# Patient Record
Sex: MALE | Race: WHITE | NOT HISPANIC OR LATINO
[De-identification: names, ages, dates, MRNs, and addresses within clinical notes are randomized per-mention and may not be internally consistent; named-entity substitution may affect disease eponyms.]

---

## 2017-10-26 ENCOUNTER — CHARTING TRANS (OUTPATIENT)
Dept: OTHER | Age: 42
End: 2017-10-26

## 2018-03-22 ENCOUNTER — CHARTING TRANS (OUTPATIENT)
Dept: OTHER | Age: 43
End: 2018-03-22

## 2018-03-22 ENCOUNTER — LAB SERVICES (OUTPATIENT)
Dept: OTHER | Age: 43
End: 2018-03-22

## 2018-03-22 ENCOUNTER — MYAURORA ACCOUNT LINK (OUTPATIENT)
Dept: OTHER | Age: 43
End: 2018-03-22

## 2018-03-23 ENCOUNTER — CHARTING TRANS (OUTPATIENT)
Dept: OTHER | Age: 43
End: 2018-03-23

## 2018-03-23 LAB
APPEARANCE UR: CLEAR
BACTERIA #/AREA URNS HPF: ABNORMAL /HPF
BILIRUB UR QL: NEGATIVE
COLOR UR: COLORLESS
GLUCOSE UR-MCNC: NEGATIVE MG/DL
HYALINE CASTS #/AREA URNS LPF: ABNORMAL /LPF (ref 0–5)
KETONES UR-MCNC: NEGATIVE MG/DL
NITRITE UR QL: NEGATIVE
PH UR: 6 UNITS (ref 5–7)
PROT UR QL: NEGATIVE MG/DL
RBC #/AREA URNS HPF: ABNORMAL /HPF (ref 0–3)
RBC-URINE: NEGATIVE
SP GR UR: 1.01 (ref 1–1.03)
SPECIMEN SOURCE: ABNORMAL
SQUAMOUS #/AREA URNS HPF: ABNORMAL /HPF (ref 0–5)
UROBILINOGEN UR QL: 0.2 MG/DL (ref 0–1)
WBC #/AREA URNS HPF: ABNORMAL /HPF (ref 0–5)
WBC-URINE: NEGATIVE

## 2018-03-26 ENCOUNTER — CHARTING TRANS (OUTPATIENT)
Dept: OTHER | Age: 43
End: 2018-03-26

## 2018-03-26 LAB
ALBUMIN SERPL-MCNC: 4.4 G/DL (ref 3.6–5.1)
ALBUMIN/GLOB SERPL: 1.3 (ref 1–2.4)
ALP SERPL-CCNC: 70 UNITS/L (ref 45–117)
ALT SERPL-CCNC: 27 UNITS/L
ANION GAP SERPL CALC-SCNC: 13 MMOL/L (ref 10–20)
AST SERPL-CCNC: 31 UNITS/L
BASOPHILS # BLD: 0 K/MCL (ref 0–0.3)
BASOPHILS NFR BLD: 1 %
BILIRUB SERPL-MCNC: 0.4 MG/DL (ref 0.2–1)
BUN SERPL-MCNC: 31 MG/DL (ref 6–20)
BUN/CREAT SERPL: 34 (ref 7–25)
CALCIUM SERPL-MCNC: 9.7 MG/DL (ref 8.4–10.2)
CHLORIDE SERPL-SCNC: 103 MMOL/L (ref 98–107)
CHOLEST SERPL-MCNC: 191 MG/DL
CHOLEST/HDLC SERPL: 4.2
CO2 SERPL-SCNC: 29 MMOL/L (ref 21–32)
CREAT SERPL-MCNC: 0.91 MG/DL (ref 0.67–1.17)
DIFFERENTIAL METHOD BLD: ABNORMAL
EOSINOPHIL # BLD: 0.1 K/MCL (ref 0.1–0.5)
EOSINOPHIL NFR BLD: 2 %
ERYTHROCYTE [DISTWIDTH] IN BLOOD: 13.3 % (ref 11–15)
GLOBULIN SER-MCNC: 3.3 G/DL (ref 2–4)
GLUCOSE SERPL-MCNC: 90 MG/DL (ref 65–99)
HDLC SERPL-MCNC: 45 MG/DL
HEMATOCRIT: 45 % (ref 39–51)
HEMOGLOBIN: 14.3 G/DL (ref 13–17)
IMM GRANULOCYTES # BLD AUTO: 0 K/MCL (ref 0–0.2)
IMM GRANULOCYTES NFR BLD: 0 %
LDLC SERPL CALC-MCNC: 134 MG/DL
LENGTH OF FAST TIME PATIENT: 12 HRS
LENGTH OF FAST TIME PATIENT: 12 HRS
LYMPHOCYTES # BLD: 2.3 K/MCL (ref 1–4.8)
LYMPHOCYTES NFR BLD: 30 %
MEAN CORPUSCULAR HEMOGLOBIN: 26.7 PG (ref 26–34)
MEAN CORPUSCULAR HGB CONC: 31.8 G/DL (ref 32–36.5)
MEAN CORPUSCULAR VOLUME: 84 FL (ref 78–100)
MONOCYTES # BLD: 0.6 K/MCL (ref 0.3–0.9)
MONOCYTES NFR BLD: 8 %
NEUTROPHILS # BLD: 4.7 K/MCL (ref 1.8–7.7)
NEUTROPHILS NFR BLD: 59 %
NONHDLC SERPL-MCNC: 146 MG/DL
NRBC (NRBCRE): 0 %
PLATELET COUNT: 200 K/MCL (ref 140–450)
POTASSIUM SERPL-SCNC: 4.7 MMOL/L (ref 3.4–5.1)
RED CELL COUNT: 5.36 MIL/MCL (ref 4.5–5.9)
SODIUM SERPL-SCNC: 140 MMOL/L (ref 135–145)
TOTAL PROTEIN: 7.7 G/DL (ref 6.4–8.2)
TRIGL SERPL-MCNC: 62 MG/DL
TSH SERPL-ACNC: 1.82 MCUNITS/ML (ref 0.35–5)
WHITE BLOOD COUNT: 7.8 K/MCL (ref 4.2–11)

## 2018-10-22 PROCEDURE — 81003 URINALYSIS AUTO W/O SCOPE: CPT | Performed by: FAMILY MEDICINE

## 2018-11-01 VITALS
SYSTOLIC BLOOD PRESSURE: 134 MMHG | DIASTOLIC BLOOD PRESSURE: 78 MMHG | TEMPERATURE: 97.9 F | RESPIRATION RATE: 16 BRPM | HEART RATE: 74 BPM | BODY MASS INDEX: 22.45 KG/M2 | WEIGHT: 160.39 LBS | HEIGHT: 71 IN

## 2018-11-02 VITALS
WEIGHT: 160.5 LBS | BODY MASS INDEX: 22.47 KG/M2 | DIASTOLIC BLOOD PRESSURE: 71 MMHG | TEMPERATURE: 98 F | HEART RATE: 93 BPM | SYSTOLIC BLOOD PRESSURE: 124 MMHG | RESPIRATION RATE: 16 BRPM | HEIGHT: 71 IN

## 2019-01-31 PROBLEM — N13.8 BPH WITH OBSTRUCTION/LOWER URINARY TRACT SYMPTOMS: Status: ACTIVE | Noted: 2019-01-31

## 2019-01-31 PROBLEM — N52.9 ERECTILE DYSFUNCTION, UNSPECIFIED ERECTILE DYSFUNCTION TYPE: Status: ACTIVE | Noted: 2019-01-31

## 2019-01-31 PROBLEM — N40.1 BPH WITH OBSTRUCTION/LOWER URINARY TRACT SYMPTOMS: Status: ACTIVE | Noted: 2019-01-31

## 2019-06-21 ENCOUNTER — TELEPHONE (OUTPATIENT)
Dept: SCHEDULING | Age: 44
End: 2019-06-21

## 2019-08-10 PROBLEM — R73.9 ELEVATED BLOOD SUGAR: Status: ACTIVE | Noted: 2019-08-10

## 2019-08-10 PROBLEM — F41.9 ANXIETY: Status: ACTIVE | Noted: 2019-08-10

## 2024-01-30 ENCOUNTER — TELEPHONE (OUTPATIENT)
Dept: FAMILY MEDICINE | Age: 49
End: 2024-01-30

## 2024-03-05 ENCOUNTER — APPOINTMENT (OUTPATIENT)
Dept: FAMILY MEDICINE | Age: 49
End: 2024-03-05

## 2024-03-05 VITALS
WEIGHT: 170.19 LBS | OXYGEN SATURATION: 98 % | HEIGHT: 71 IN | HEART RATE: 88 BPM | RESPIRATION RATE: 18 BRPM | SYSTOLIC BLOOD PRESSURE: 142 MMHG | BODY MASS INDEX: 23.83 KG/M2 | DIASTOLIC BLOOD PRESSURE: 80 MMHG

## 2024-03-05 DIAGNOSIS — N13.8 BPH WITH OBSTRUCTION/LOWER URINARY TRACT SYMPTOMS: ICD-10-CM

## 2024-03-05 DIAGNOSIS — Z12.11 COLON CANCER SCREENING: ICD-10-CM

## 2024-03-05 DIAGNOSIS — N40.1 BPH WITH OBSTRUCTION/LOWER URINARY TRACT SYMPTOMS: ICD-10-CM

## 2024-03-05 DIAGNOSIS — N52.9 ERECTILE DYSFUNCTION, UNSPECIFIED ERECTILE DYSFUNCTION TYPE: ICD-10-CM

## 2024-03-05 DIAGNOSIS — Z00.00 PHYSICAL EXAM, ANNUAL: Primary | ICD-10-CM

## 2024-03-05 PROBLEM — F41.9 ANXIETY: Status: ACTIVE | Noted: 2019-08-10

## 2024-03-05 PROCEDURE — 99386 PREV VISIT NEW AGE 40-64: CPT | Performed by: NURSE PRACTITIONER

## 2024-03-05 RX ORDER — TADALAFIL 20 MG/1
20 TABLET ORAL DAILY PRN
Qty: 30 TABLET | Refills: 3 | Status: SHIPPED | OUTPATIENT
Start: 2024-03-05

## 2024-03-05 RX ORDER — TADALAFIL 20 MG/1
20 TABLET ORAL DAILY PRN
COMMUNITY
End: 2024-03-05 | Stop reason: SDUPTHER

## 2024-03-05 ASSESSMENT — PATIENT HEALTH QUESTIONNAIRE - PHQ9
1. LITTLE INTEREST OR PLEASURE IN DOING THINGS: NOT AT ALL
SUM OF ALL RESPONSES TO PHQ9 QUESTIONS 1 AND 2: 0
2. FEELING DOWN, DEPRESSED OR HOPELESS: NOT AT ALL
CLINICAL INTERPRETATION OF PHQ2 SCORE: NO FURTHER SCREENING NEEDED
SUM OF ALL RESPONSES TO PHQ9 QUESTIONS 1 AND 2: 0

## 2024-03-05 ASSESSMENT — ENCOUNTER SYMPTOMS
PSYCHIATRIC NEGATIVE: 1
RESPIRATORY NEGATIVE: 1
NEUROLOGICAL NEGATIVE: 1
CONSTITUTIONAL NEGATIVE: 1

## 2024-03-06 ENCOUNTER — APPOINTMENT (OUTPATIENT)
Dept: LAB | Age: 49
End: 2024-03-06

## 2024-03-06 ENCOUNTER — TELEPHONE (OUTPATIENT)
Dept: FAMILY MEDICINE | Age: 49
End: 2024-03-06

## 2024-03-06 DIAGNOSIS — H81.09 MENIERE'S DISEASE, UNSPECIFIED LATERALITY: Primary | ICD-10-CM

## 2024-03-07 PROBLEM — H81.09 MENIERE'S DISEASE: Status: ACTIVE | Noted: 2024-03-07

## 2024-07-02 ENCOUNTER — CLINICAL DOCUMENTATION (OUTPATIENT)
Dept: GASTROENTEROLOGY | Age: 49
End: 2024-07-02

## 2025-01-14 DIAGNOSIS — N52.9 ERECTILE DYSFUNCTION, UNSPECIFIED ERECTILE DYSFUNCTION TYPE: ICD-10-CM

## 2025-01-14 RX ORDER — TADALAFIL 20 MG/1
20 TABLET ORAL DAILY PRN
Qty: 10 TABLET | Refills: 0 | Status: SHIPPED | OUTPATIENT
Start: 2025-01-14

## 2025-01-23 ENCOUNTER — OFFICE VISIT (OUTPATIENT)
Facility: LOCATION | Age: 50
End: 2025-01-23

## 2025-01-23 VITALS — BODY MASS INDEX: 23.8 KG/M2 | HEIGHT: 71 IN | WEIGHT: 170 LBS

## 2025-01-23 DIAGNOSIS — H81.01: ICD-10-CM

## 2025-01-23 DIAGNOSIS — R42 VERTIGO: Primary | ICD-10-CM

## 2025-01-23 DIAGNOSIS — G43.809 VESTIBULAR MIGRAINE: ICD-10-CM

## 2025-01-23 PROCEDURE — 99204 OFFICE O/P NEW MOD 45 MIN: CPT | Performed by: OTOLARYNGOLOGY

## 2025-01-23 RX ORDER — TADALAFIL 20 MG/1
20 TABLET ORAL
COMMUNITY
Start: 2025-01-14

## 2025-01-23 RX ORDER — ONDANSETRON 4 MG/1
4 TABLET, FILM COATED ORAL
COMMUNITY
Start: 2022-09-23

## 2025-01-23 RX ORDER — HYDROXYZINE HYDROCHLORIDE 25 MG/1
25 TABLET, FILM COATED ORAL
COMMUNITY
Start: 2024-09-05

## 2025-01-23 RX ORDER — MECLIZINE HYDROCHLORIDE 25 MG/1
1 TABLET ORAL AS DIRECTED
COMMUNITY
Start: 2024-08-20

## 2025-01-23 RX ORDER — LORAZEPAM 1 MG/1
1 TABLET ORAL
COMMUNITY
Start: 2024-10-29

## 2025-01-23 RX ORDER — TRIAMTERENE AND HYDROCHLOROTHIAZIDE 37.5; 25 MG/1; MG/1
2 CAPSULE ORAL DAILY
COMMUNITY
Start: 2024-11-19

## 2025-01-23 RX ORDER — TADALAFIL 20 MG/1
20 TABLET ORAL
COMMUNITY
Start: 2022-10-24

## 2025-01-23 RX ORDER — TRIAMTERENE AND HYDROCHLOROTHIAZIDE 37.5; 25 MG/1; MG/1
1 CAPSULE ORAL EVERY MORNING
COMMUNITY
Start: 2024-11-19 | End: 2025-03-19

## 2025-01-23 RX ORDER — SUMATRIPTAN 50 MG/1
50 TABLET, FILM COATED ORAL
COMMUNITY
Start: 2024-10-29

## 2025-01-23 RX ORDER — TAMSULOSIN HYDROCHLORIDE 0.4 MG/1
0.4 CAPSULE ORAL
COMMUNITY
Start: 2022-12-04

## 2025-01-23 NOTE — PROGRESS NOTES
NEW PATIENT PROGRESS NOTE  OTOLOGY/OTOLARYNGOLOGY    REF MD:  No referring provider defined for this encounter.     PCP: Saul Gregory DO    CHIEF COMPLAINT:    Chief Complaint   Patient presents with    New Patient    Consult     Patient here for Meniere's disease consult.     HISTORY OF PRESENT ILLNESS: Navi Portillo is a 49 year old male patient presents for evaluation of Meniere's disease, which he has been experiencing in his right ear for the past three years. The patient had been seen at [facility] in 2021 by Dr. Lorenzo multiple times for Meniere's syndrome and chronic sinusitis. He has undergone an extensive workup by Dr. Silverman, and most recently saw Dr. Menchaca on 12/13/2024, who advised increasing his Dyazide dosage to two tablets per day, and recommended magnesium. He is referred here to consider vestibular migraine.     Patient reports experiencing dizziness that can last from 10 minutes to several hours. He describes the dizziness as feeling as if he is upside down and spinning. Between episodes, he experiences tinnitus and ear pressure. He also reports experiencing extremely brief episodes of vertigo, each lasting around 2 minutes. He also has a tone-like tinnitus when he presses on his right ear.    His most recent audiogram results show sloping sensorineural hearing loss in the right ear. MRI and CT scan results were normal. He has received three steroid injections with no improvement. The patient is currently on diphenhydramine and Dramamine. His only surgery to date has been a nasal septal repair. He has tried magnesium supplements, meclizine, and hydroxyzine all without notable improvement.    PAST MEDICAL HISTORY:    Past Medical History:    Anxiety    Erectile dysfunction, unspecified erectile dysfunction type    Migraine       PAST SURGICAL HISTORY:    Past Surgical History:   Procedure Laterality Date    Other surgical history  about 2013    Deviated Septum Repair    Other surgical  history  12/14/2018    Cystoscopy w/ Dr Jones    Sinus surgery        Vasectomy         Medications Ordered Prior to Encounter[1]    Allergies: Allergies[2]    SOCIAL HISTORY:    Social History     Tobacco Use    Smoking status: Never    Smokeless tobacco: Never   Substance Use Topics    Alcohol use: Yes       Family History   Problem Relation Age of Onset    No Known Problems Father     Other (Other) Mother         migraines       REVIEW OF SYSTEMS:   Positives are in bold  Neuro: Headache, facial weakness, facial numbness, neck pain, vertigo  ENT: Hearing change, tinnitus, otorrhea, otalgia, aural fullness, ear pressure, vertigo, imbalance  Sinus pressure, rhinorrhea, congestion, facial pain, jaw pain, dysphagia, odynophagia, sore throat, voice changes, shortness of breath    EXAMINATION:  I washed my hands with an alcohol-based hand gel prior to examination  Constitutional:   --Vitals: Height 5' 11\" (1.803 m), weight 170 lb (77.1 kg).  --General: no apparent distress, well-developed, conversant  Psych: affect pleasant and appropriate for age, alert and oriented  Neuro: Facial movement normal bilateral  Respiratory: No stridor, stertor or increased work of breathing  ENT:  --Ear: The bilateral ears were examined under binocular microscopy  Right ear microscopic exam:  Pinna: Normal, no lesions or masses.  Mastoid: Nontender on palpation.   External auditory canal: Clear, no masses or lesions.  Tympanic membrane: Intact, no lesions, normal landmarks.  Middle ear: Aerated.    Left ear microscopic exam:  Pinna: Normal, no lesions or masses.  Mastoid: Nontender on palpation.   External auditory canal: Clear, no masses or lesions.  Tympanic membrane: Intact, no lesions, normal landmarks.  Middle ear: Aerated.    CT Remporal Bones - 6/27/2024  IMPRESSION: Essentially negative CT temporal bones survey.    MRI IACs 2/25/2022  Impression   IMPRESSION:  Normal contrast-enhanced MRI of the brain and IACs.        ASSESSMENT/PLAN:  Navi Portillo is a 49 year old male with     ICD-10-CM   1. Vertigo  R42   2. Active cochleovestibular Meniere's disease, right  H81.01   3. Vestibular migraine  G43.809        IMPRESSION:  Possible right Ménière's disease  History of chronic migraine headache, now with possible vestibular migraine  CT Temporal Bones with no evidence of SSCD  Occasional vestibular paroxysmia, which can cause tone-like tinnitus when pressing on the right ear    PLAN:  -Recommend ECOG, VNG, and VEMP for further evaluation   -Patient recently underwent an audiogram 5-6 weeks ago, however, it is not available for review today. He will obtain a copy of the most recent audiogram and submit it via [employer]   -Patient had an MRI IACs from 2022. Patient has occasional vestibular paroxysmia, which can cause tone-like tinnitus when pressing on the right ear. Patient will try to obtain a CD of the MRI with [facility] to check for neurovascular conflict with cranial nerve 8.   -CT scan of the temporal bone was also performed, but images are not available. However, no SSCD was noted on the report.  -Continue the current management of Ménière's disease and migraines.  -Patient had received IT injections in the past but did not undergo a series of 4 injections. It is unclear what his response was.   -Lifestyle changes including stress reduction, migraine diet (caffeine cessation), sleep hygiene, hydration, regular meals discussed  -Patient education: Migraine: More than a Headache; this packet includes information regarding medication side effects  -Symptom diary  -Follow-up after testing     Situation reviewed with the patient in detail.    Attention: This note has been scribed by Quita Quezada under the supervision of Liam Eng MD.     Liam Eng MD  Otology/Otolaryngology  [facility] [address] [place] [address]  Phone [phone]  Fax  [phone]      I have personally performed the services described in this documentation. All medical record entries made by the scribe were at my direction and in my presence. I have reviewed the chart and agree that the medical record reflects my personal performance and is accurate and complete.             [1]   Current Outpatient Medications on File Prior to Visit   Medication Sig Dispense Refill    triamterene-hydroCHLOROthiazide 37.5-25 MG Oral Cap Take 1 capsule by mouth every morning.      triamterene-hydroCHLOROthiazide 37.5-25 MG Oral Cap Take 2 capsules by mouth daily.      hydrOXYzine 25 MG Oral Tab Take 1 tablet (25 mg total) by mouth. (Patient not taking: Reported on 1/23/2025)      LORazepam 1 MG Oral Tab Take 1 tablet (1 mg total) by mouth. (Patient not taking: Reported on 1/23/2025)      meclizine 25 MG Oral Tab Take 1 tablet (25 mg total) by mouth As Directed. (Patient not taking: Reported on 1/23/2025)      ondansetron (ZOFRAN) 4 mg tablet 1 tablet (4 mg total). (Patient not taking: Reported on 1/23/2025)      SUMAtriptan 50 MG Oral Tab Take 1 tablet (50 mg total) by mouth. (Patient not taking: Reported on 1/23/2025)      Tadalafil 20 MG Oral Tab Take 1 tablet (20 mg total) by mouth daily as needed. (Patient not taking: Reported on 1/23/2025)      Tadalafil, PAH, 20 MG Oral Tab Take 20 mg by mouth daily as needed. (Patient not taking: Reported on 1/23/2025)      tamsulosin (FLOMAX) 0.4 MG Oral Cap 1 capsule (0.4 mg total). (Patient not taking: Reported on 1/23/2025)      cyclobenzaprine 5 MG Oral Tab Take 1 tablet (5 mg total) by mouth nightly as needed for Muscle spasms. (Patient not taking: Reported on 1/23/2025) 10 tablet 0    Sildenafil Citrate 100 MG Oral Tab Take 1/2 TO 1 tablet (100 mg total) by mouth as needed for Erectile Dysfunction. (Patient not taking: Reported on 1/23/2025) 30 tablet 3     No current facility-administered medications on file prior to visit.   [2] No Known  Allergies

## 2025-02-26 ENCOUNTER — OFFICE VISIT (OUTPATIENT)
Dept: AUDIOLOGY | Facility: CLINIC | Age: 50
End: 2025-02-26

## 2025-02-26 DIAGNOSIS — R42 DIZZINESS: Primary | ICD-10-CM

## 2025-02-26 PROBLEM — H90.3 ASYMMETRICAL SENSORINEURAL HEARING LOSS: Status: ACTIVE | Noted: 2025-02-26

## 2025-02-26 PROCEDURE — 92517 VEMP TEST I&R CERVICAL: CPT | Performed by: AUDIOLOGIST

## 2025-02-26 PROCEDURE — 92567 TYMPANOMETRY: CPT | Performed by: AUDIOLOGIST

## 2025-02-26 PROCEDURE — 92540 BASIC VESTIBULAR EVALUATION: CPT | Performed by: AUDIOLOGIST

## 2025-02-26 PROCEDURE — 92557 COMPREHENSIVE HEARING TEST: CPT | Performed by: AUDIOLOGIST

## 2025-02-26 PROCEDURE — 92537 CALORIC VSTBLR TEST W/REC: CPT | Performed by: AUDIOLOGIST

## 2025-02-27 ENCOUNTER — TELEPHONE (OUTPATIENT)
Dept: AUDIOLOGY | Facility: CLINIC | Age: 50
End: 2025-02-27

## 2025-02-27 NOTE — TELEPHONE ENCOUNTER
Patient was scheduled for an ECOG on 3//4/2025.  After having hearing testing done yesterday and consultation with Dr. Wall, it was decided that loss is too severe for ECOG testing.  Spoke with patient re:above and advised we are cancelling the ECOG on 3/4/2025- he should wait to hear from Dr. Wall after she gets a chance to review yesterdays test results.  Patient is aware

## 2025-02-27 NOTE — PROGRESS NOTES
Audiometrics:  VNG    Navi Portillo  12/6/1975  [id number]    Navi Portillo was referred for testing by Liam Eng.     History:  1/23/2025  From Dr. Wall    HISTORY OF PRESENT ILLNESS: Navi Portillo is a 49 year old male patient presents for evaluation of Meniere's disease, which he has been experiencing in his right ear for the past three years. The patient had been seen at [facility] in 2021 by Dr. Lorenzo multiple times for Meniere's syndrome and chronic sinusitis. He has undergone an extensive workup by Dr. Silverman, and most recently saw Dr. Menchaca on 12/13/2024, who advised increasing his Dyazide dosage to two tablets per day, and recommended magnesium. He is referred here to consider vestibular migraine.      Patient reports experiencing dizziness that can last from 10 minutes to several hours. He describes the dizziness as feeling as if he is upside down and spinning. Between episodes, he experiences tinnitus and ear pressure. He also reports experiencing extremely brief episodes of vertigo, each lasting around 2 minutes. He also has a tone-like tinnitus when he presses on his right ear.     His most recent audiogram results show sloping sensorineural hearing loss in the right ear. MRI and CT scan results were normal. He has received three steroid injections with no improvement. The patient is currently on diphenhydramine and Dramamine. His only surgery to date has been a nasal septal repair. He has tried magnesium supplements, meclizine, and hydroxyzine all without notable improvement.    Today-  Patient noted as above. His hearing remains poor on the right side and has roaring tinnitus.  Per report, since Dr. Menchaca increased the dosage of his diuretic two months ago, he has had no bouts of vertigo and he feels much better    He noted following all VNG instructions prior to testing today.    Procedures completed today:  Saccade Test: Normal peak velocities, accuracies and  latencies for horizontal saccades.    Gaze Nystagmus Test: No gaze nystagmus noted.    Tracking Test: Normal horizontal tracking in both directions.    Optokinetic Test: Symmetrical optokinetic testing in both directions at the increased intensity of 40/sec.    Head Shaking Test: No nystagmus was present during horizontal head shaking test.    [place]-Hallpike Maneuver: No evidence of torsional nystagmus during the Carlson-Franklin maneuver.    Positioning Test: No positioning or positional nystagmus was observed.    Bithermal Caloric Test:   Left cool caloric: direction -right, 9 deg/sec  Right cool caloric: direction -left, 4 deg/sec  Left warm caloric: direction -left, 9 deg/sec  Right warm caloric: direction -right, 6 deg/sec    Interpretation:  Right unilateral caloric weakness.    Caloric responses of the right ear are 28% weaker than those of the left ear.  Unilateral weakness measured as greater than 25% is considered to be clinically significant.  Caloric weakness usually indicates a peripheral vestibular lesion involving the lateral canal or its afferent pathways.    Borderline bilateral caloric weakness.    Bilateral weakness is categorized as sum for all 4 caloric responses less than 22 deg  ( Patient was 28)  Or Total responses from each ear less than 12 deg   ( Patient R=10. L=18)    Recommendations:  Follow-up with Liam Eng M.D. for results.        Vestibular Evoked Myogenic Potential (VEMP)    Stimulus Parameters: Yesica gated 500HZ tonebursts presented at 97dBnHL.  Transducers used: inserts      Despite repeated attempts and runs, poor repeatability and reliability for the waveforms  Singular responses were obtained for marking   Right ear responses were significantly delayed  Interpret with caution     Amplitude:  Right:  abnormal 43.73 uV.  Left:  abnormal 34.51 uV.    Normal amplitudes = 60uV    Asymmetry ratio shows a .12 weakness in the left ear.  This is an insignificant finding.      2/27/2025  Kervin Lopez

## 2025-03-20 ENCOUNTER — LAB ENCOUNTER (OUTPATIENT)
Dept: LAB | Age: 50
End: 2025-03-20
Attending: OTOLARYNGOLOGY
Payer: MEDICAID

## 2025-03-20 ENCOUNTER — OFFICE VISIT (OUTPATIENT)
Facility: LOCATION | Age: 50
End: 2025-03-20
Payer: MEDICAID

## 2025-03-20 VITALS — BODY MASS INDEX: 23.8 KG/M2 | WEIGHT: 170 LBS | HEIGHT: 71 IN

## 2025-03-20 DIAGNOSIS — H90.3 ASYMMETRIC SNHL (SENSORINEURAL HEARING LOSS): ICD-10-CM

## 2025-03-20 DIAGNOSIS — H81.8X9 VESTIBULAR PAROXYSMIA: Primary | ICD-10-CM

## 2025-03-20 DIAGNOSIS — H83.8X1 SUPERIOR SEMICIRCULAR CANAL DEHISCENCE OF RIGHT EAR: ICD-10-CM

## 2025-03-20 DIAGNOSIS — R42 VERTIGO: ICD-10-CM

## 2025-03-20 LAB
ANION GAP SERPL CALC-SCNC: 3 MMOL/L (ref 0–18)
BUN BLD-MCNC: 28 MG/DL (ref 9–23)
BUN/CREAT SERPL: 25 (ref 10–20)
CALCIUM BLD-MCNC: 9.4 MG/DL (ref 8.7–10.4)
CHLORIDE SERPL-SCNC: 101 MMOL/L (ref 98–112)
CO2 SERPL-SCNC: 33 MMOL/L (ref 21–32)
CREAT BLD-MCNC: 1.12 MG/DL
EGFRCR SERPLBLD CKD-EPI 2021: 80 ML/MIN/1.73M2 (ref 60–?)
GLUCOSE BLD-MCNC: 89 MG/DL (ref 70–99)
OSMOLALITY SERPL CALC.SUM OF ELEC: 289 MOSM/KG (ref 275–295)
POTASSIUM SERPL-SCNC: 5 MMOL/L (ref 3.5–5.1)
SODIUM SERPL-SCNC: 137 MMOL/L (ref 136–145)

## 2025-03-20 PROCEDURE — 36415 COLL VENOUS BLD VENIPUNCTURE: CPT

## 2025-03-20 PROCEDURE — 80048 BASIC METABOLIC PNL TOTAL CA: CPT

## 2025-03-20 PROCEDURE — 99214 OFFICE O/P EST MOD 30 MIN: CPT | Performed by: OTOLARYNGOLOGY

## 2025-03-20 RX ORDER — TRIAMTERENE AND HYDROCHLOROTHIAZIDE 37.5; 25 MG/1; MG/1
1 CAPSULE ORAL 2 TIMES DAILY
Qty: 60 CAPSULE | Refills: 1 | Status: SHIPPED | OUTPATIENT
Start: 2025-03-20

## 2025-03-20 RX ORDER — VENLAFAXINE HYDROCHLORIDE 37.5 MG/1
37.5 CAPSULE, EXTENDED RELEASE ORAL DAILY
Qty: 30 CAPSULE | Refills: 1 | Status: SHIPPED | OUTPATIENT
Start: 2025-03-20

## 2025-03-20 NOTE — PROGRESS NOTES
PROGRESS NOTE  OTOLOGY/OTOLARYNGOLOGY    REF MD:  No referring provider defined for this encounter.     PCP: Saul Gregory DO    CHIEF COMPLAINT:    No chief complaint on file.    LAST VISIT 1/23/2025  IMPRESSION:  Possible right Ménière's disease  History of chronic migraine headache, now with possible vestibular migraine  CT Temporal Bones with no evidence of SSCD  Occasional vestibular paroxysmia, which can cause tone-like tinnitus when pressing on the right ear    PLAN:  -Recommend ECOG, VNG, and VEMP for further evaluation   -Patient recently underwent an audiogram 5-6 weeks ago, however, it is not available for review today. He will obtain a copy of the most recent audiogram and submit it via [employer]   -Patient had an MRI IACs from 2022. Patient has occasional vestibular paroxysmia, which can cause tone-like tinnitus when pressing on the right ear. Patient will try to obtain a CD of the MRI with [facility] to check for neurovascular conflict with cranial nerve 8.   -CT scan of the temporal bone was also performed, but images are not available. However, no SSCD was noted on the report.  -Continue the current management of Ménière's disease and migraines.  -Patient had received IT injections in the past but did not undergo a series of 4 injections. It is unclear what his response was.   -Lifestyle changes including stress reduction, migraine diet (caffeine cessation), sleep hygiene, hydration, regular meals discussed  -Patient education: Migraine: More than a Headache; this packet includes information regarding medication side effects  -Symptom diary  -Follow-up after testing   ______________________________________________________________________  Interval history: An increase in Diazide has been helpful. However, symptoms persist, including a constant sensation of feeling sideways and ongoing tingling in the head. Feels as though something could happen at any moment. Symptoms are exacerbated by certain  movements, such as bending, and he is unable to engage in certain physical activities due to these limitations. ECOG was not performed due to degree of hearing loss.       HISTORY OF PRESENT ILLNESS: Navi Portillo is a 49 year old male patient presents for evaluation of Meniere's disease, which he has been experiencing in his right ear for the past three years. The patient had been seen at [facility] in 2021 by Dr. Lorenzo multiple times for Meniere's syndrome and chronic sinusitis. He has undergone an extensive workup by Dr. Silverman, and most recently saw Dr. Menchaca on 12/13/2024, who advised increasing his Dyazide dosage to two tablets per day, and recommended magnesium. He is referred here to consider vestibular migraine.     Patient reports experiencing dizziness that can last from 10 minutes to several hours. He describes the dizziness as feeling as if he is upside down and spinning. Between episodes, he experiences tinnitus and ear pressure. He also reports experiencing extremely brief episodes of vertigo, each lasting around 2 minutes. He also has a tone-like tinnitus when he presses on his right ear.    His most recent audiogram results show sloping sensorineural hearing loss in the right ear. MRI and CT scan results were normal. He has received three steroid injections with no improvement. The patient is currently on diphenhydramine and Dramamine. His only surgery to date has been a nasal septal repair. He has tried magnesium supplements, meclizine, and hydroxyzine all without notable improvement.    PAST MEDICAL HISTORY:    Past Medical History:    Anxiety    Erectile dysfunction, unspecified erectile dysfunction type    Migraine       PAST SURGICAL HISTORY:    Past Surgical History:   Procedure Laterality Date    Other surgical history  about 2013    Deviated Septum Repair    Other surgical history  12/14/2018    Cystoscopy w/ Dr Jones    Sinus surgery        Vasectomy         Medications Ordered  Prior to Encounter[1]    Allergies: Allergies[2]    SOCIAL HISTORY:    Social History     Tobacco Use    Smoking status: Never    Smokeless tobacco: Never   Substance Use Topics    Alcohol use: Yes       Family History   Problem Relation Age of Onset    No Known Problems Father     Other (Other) Mother         migraines       REVIEW OF SYSTEMS:   Positives are in bold  Neuro: Headache, facial weakness, facial numbness, neck pain, vertigo  ENT: Hearing change, tinnitus, otorrhea, otalgia, aural fullness, ear pressure, vertigo, imbalance  Sinus pressure, rhinorrhea, congestion, facial pain, jaw pain, dysphagia, odynophagia, sore throat, voice changes, shortness of breath    EXAMINATION:  I washed my hands with an alcohol-based hand gel prior to examination  Constitutional:   --Vitals: There were no vitals taken for this visit.  --General: no apparent distress, well-developed, conversant  Psych: affect pleasant and appropriate for age, alert and oriented  Neuro: Facial movement normal bilateral  Respiratory: No stridor, stertor or increased work of breathing  ENT:  --Ear: The bilateral ears were examined under binocular microscopy  Right ear microscopic exam:  Pinna: Normal, no lesions or masses.  Mastoid: Nontender on palpation.   External auditory canal: Clear, no masses or lesions.  Tympanic membrane: Intact, no lesions, normal landmarks.  Middle ear: Aerated.    Left ear microscopic exam:  Pinna: Normal, no lesions or masses.  Mastoid: Nontender on palpation.   External auditory canal: Clear, no masses or lesions.  Tympanic membrane: Intact, no lesions, normal landmarks.  Middle ear: Aerated.    CT Remporal Bones - 6/27/2024  IMPRESSION: Essentially negative CT temporal bones survey.    MRI IACs 2/25/2022  Impression   IMPRESSION:  Normal contrast-enhanced MRI of the brain and IACs.     VNG 2/27/2025  Interpretation:  Right unilateral caloric weakness.    Caloric responses of the right ear are 28% weaker than those  of the left ear.  Unilateral weakness measured as greater than 25% is considered to be clinically significant.  Caloric weakness usually indicates a peripheral vestibular lesion involving the lateral canal or its afferent pathways.     Borderline bilateral caloric weakness.    Bilateral weakness is categorized as sum for all 4 caloric responses less than 22 deg  ( Patient was 28)  Or Total responses from each ear less than 12 deg   ( Patient R=10. L=18)     Latest Audiogram Result (Hz) Exam performed: 2/26/2025 3:20 PM Last edited by Jesenia Stubbs Au.D on 2/26/2025 3:30 PM        125 250  1500 2000 3000 4000 6000 8000    Right air:         60  65    Left air:  10 20  15  15 45 40 10 20    Right air (masked):  65 65  65  60  60      Left mastoid bone:   5  15  10 35 45      Right mastoid bone (masked):   50N  60  60  55         Reliability:  Fair    Transducer:  Inserts    Technique:  Conventional Audiometry    Comments:  Negative Mindy 1000z          Latest Speech Audiometry  Last edited by Jesenia Stubbs Au.D on 2/26/2025 3:30 PM       Ear Method PTA SAT SRT [facility] Test/list Score (%) Intensity Mask/noise Notes    right live voice  65    Half-List 12 75  masked    left live voice  20    10 By Difficulty 96 55                    Latest Tympanogram Result       Probe Tone (Hz): 226 Exam performed: 2/26/2025 3:20 PM Last edited by Jesenia Stubbs Au.D on 2/26/2025 3:31 PM      Tympanograms  These were drawn by a user, not generated from device data      Right Ear Left Ear                     Right Ear Left Ear    Tympanogram type: Type A Type C    Canal volume (mL): 1.3 1.2    Peak pressure (daPa): 10 -101    Peak amplitude (mL): 0.8 0.79    Tympanogram width (daPa):        Comments:                    Latest Audiogram and Tympanogram Result Text  Last edited by Jesenia Stubbs Au.D on 2/26/2025  4:03 PM      Addendum      Patient here for testing on referral from Dr. Wall  He is here for  HT/VNG/VEMP today    ECoG testing is scheduled for 3/4/2025    See history and notes on VNG/VEMP report     Otoscopic Inspection:  both ears: no cerumen and TM visualized    Summary  Right: Moderately severe SNHL  Left: WNL with moderate notched SNHL at 3000/4000Hz.    Normal tympanogram for the right ear   Negative pressure tympanogram for the left ear     Follow up with Liam Eng M.D..  Audiological monitoring as needed during the course of medical management.       Addended by Jesenia Stubbs Au.D on 2/26/2025  4:03 PM                ASSESSMENT/PLAN:  Navi Portillo is a 49 year old male with   No diagnosis found.       IMPRESSION:  Possible right Ménière's disease  History of chronic migraine headache, now with possible vestibular migraine  Occasional vestibular paroxysmia, which can cause tone-like tinnitus when pressing on the right ear  Right moderately severe SNHL  Left mild SNHL, in high frequencies only    PLAN:  -VNG reviewed and Audiogram reviewed with patient  -ECOG testing was unable to be performed due to severe hearing loss, 28% caloric weakness on right, but slight hypofunction on left  -Start venlafaxine 37.5 mg daily  -Possible common side effects include fatigue, mood change, tachycardia, hypertension, constipation. Ok to stop without a taper if needed at this low dose  -Next option for medication will be topiramate  -Recommend repeat CT Temporal Bones w/o contrast to evaluate for third window such as SSCD.   -BMP ordered to monitor kidney function and electrolytes  -I recommend asymmetric sensorineural hearing loss and/or unilateral tinnitus be evaluated with an MRI of the brain and IAC's with gadolinium. This test is medically necessary to assess for retrocochlear pathology such as a vestibular schwannoma  -Patient had an MRI IACs from 2022. Patient will try to obtain a CD of the MRI with [facility] to check for neurovascular conflict with cranial nerve 8. Otherwise, repeat  imaging has been ordered  -Continue the current management of Ménière's disease and migraines.  -Continue dyazide - usually I do not recommend BID dosing, but this was initiated by an outside provider and patient feels symptom improvement with this regimen - refilled   -Patient had received IT injections in the past but did not undergo a series of 4 injections. It is unclear what his response was.   -Lifestyle changes including stress reduction, migraine diet (caffeine cessation), sleep hygiene, hydration, regular meals discussed  -Patient education: Migraine: More than a Headache; this packet includes information regarding medication side effects  -Symptom diary  -Follow up after imaging     Situation reviewed with the patient in detail.    Attention: This note has been scribed by Quita Quezada under the supervision of Liam Eng MD.     Liam Eng MD  Otology/Otolaryngology  [facility] [address] [address]  Phone [phone]  Fax [phone]      I have personally performed the services described in this documentation. All medical record entries made by the scribe were at my direction and in my presence. I have reviewed the chart and agree that the medical record reflects my personal performance and is accurate and complete.             [1]   Current Outpatient Medications on File Prior to Visit   Medication Sig Dispense Refill    hydrOXYzine 25 MG Oral Tab Take 1 tablet (25 mg total) by mouth. (Patient not taking: Reported on 1/23/2025)      LORazepam 1 MG Oral Tab Take 1 tablet (1 mg total) by mouth. (Patient not taking: Reported on 1/23/2025)      meclizine 25 MG Oral Tab Take 1 tablet (25 mg total) by mouth As Directed. (Patient not taking: Reported on 1/23/2025)      ondansetron (ZOFRAN) 4 mg tablet 1 tablet (4 mg total). (Patient not taking: Reported on 1/23/2025)      SUMAtriptan 50 MG Oral Tab Take 1 tablet (50 mg total) by  mouth. (Patient not taking: Reported on 1/23/2025)      Tadalafil 20 MG Oral Tab Take 1 tablet (20 mg total) by mouth daily as needed. (Patient not taking: Reported on 1/23/2025)      Tadalafil, PAH, 20 MG Oral Tab Take 20 mg by mouth daily as needed. (Patient not taking: Reported on 1/23/2025)      tamsulosin (FLOMAX) 0.4 MG Oral Cap 1 capsule (0.4 mg total). (Patient not taking: Reported on 1/23/2025)      triamterene-hydroCHLOROthiazide 37.5-25 MG Oral Cap Take 1 capsule by mouth every morning.      triamterene-hydroCHLOROthiazide 37.5-25 MG Oral Cap Take 2 capsules by mouth daily.      cyclobenzaprine 5 MG Oral Tab Take 1 tablet (5 mg total) by mouth nightly as needed for Muscle spasms. (Patient not taking: Reported on 1/23/2025) 10 tablet 0    Sildenafil Citrate 100 MG Oral Tab Take 1/2 TO 1 tablet (100 mg total) by mouth as needed for Erectile Dysfunction. (Patient not taking: Reported on 1/23/2025) 30 tablet 3     No current facility-administered medications on file prior to visit.   [2] No Known Allergies

## 2025-04-07 ENCOUNTER — HOSPITAL ENCOUNTER (OUTPATIENT)
Dept: CT IMAGING | Age: 50
Discharge: HOME | End: 2025-04-07
Attending: OTOLARYNGOLOGY
Payer: MEDICAID

## 2025-04-07 DIAGNOSIS — H83.8X1 SUPERIOR SEMICIRCULAR CANAL DEHISCENCE OF RIGHT EAR: ICD-10-CM

## 2025-04-07 PROCEDURE — 70480 CT ORBIT/EAR/FOSSA W/O DYE: CPT | Performed by: OTOLARYNGOLOGY

## 2025-04-21 ENCOUNTER — HOSPITAL ENCOUNTER (OUTPATIENT)
Dept: MRI IMAGING | Age: 50
Discharge: HOME | End: 2025-04-21
Attending: OTOLARYNGOLOGY
Payer: MEDICAID

## 2025-04-21 DIAGNOSIS — H81.8X9 VESTIBULAR PAROXYSMIA: ICD-10-CM

## 2025-04-21 PROCEDURE — 70553 MRI BRAIN STEM W/O & W/DYE: CPT | Performed by: OTOLARYNGOLOGY

## 2025-04-21 PROCEDURE — A9575 INJ GADOTERATE MEGLUMI 0.1ML: HCPCS | Performed by: OTOLARYNGOLOGY

## 2025-04-21 RX ORDER — GADOTERATE MEGLUMINE 376.9 MG/ML
15 INJECTION INTRAVENOUS
Status: COMPLETED | OUTPATIENT
Start: 2025-04-21 | End: 2025-04-21

## 2025-04-21 RX ADMIN — GADOTERATE MEGLUMINE 15 ML: 376.9 INJECTION INTRAVENOUS at 13:40:00

## 2025-05-01 ENCOUNTER — OFFICE VISIT (OUTPATIENT)
Facility: LOCATION | Age: 50
End: 2025-05-01
Payer: MEDICAID

## 2025-05-01 VITALS — HEIGHT: 71 IN | BODY MASS INDEX: 23.8 KG/M2 | WEIGHT: 170 LBS

## 2025-05-01 DIAGNOSIS — H81.01: Primary | ICD-10-CM

## 2025-05-01 DIAGNOSIS — H81.8X9 VESTIBULAR PAROXYSMIA: ICD-10-CM

## 2025-05-01 DIAGNOSIS — H90.3 ASYMMETRIC SNHL (SENSORINEURAL HEARING LOSS): ICD-10-CM

## 2025-05-01 DIAGNOSIS — R42 VERTIGO: ICD-10-CM

## 2025-05-01 DIAGNOSIS — G43.809 VESTIBULAR MIGRAINE: ICD-10-CM

## 2025-05-01 PROCEDURE — 99214 OFFICE O/P EST MOD 30 MIN: CPT | Performed by: OTOLARYNGOLOGY

## 2025-05-01 RX ORDER — TOPIRAMATE 25 MG/1
25 TABLET, FILM COATED ORAL 2 TIMES DAILY
Qty: 60 TABLET | Refills: 1 | Status: SHIPPED | OUTPATIENT
Start: 2025-05-01

## 2025-05-01 NOTE — PROGRESS NOTES
The following individual(s) verbally consented to be recorded using ambient AI listening technology and understand that they can each withdraw their consent to this listening technology at any point by asking the clinician to turn off or pause the recording: patient verbalized consent    Patient name: Navi LUCI Portillo

## 2025-05-01 NOTE — PROGRESS NOTES
PROGRESS NOTE  OTOLOGY/OTOLARYNGOLOGY    REF MD:  No referring provider defined for this encounter.     PCP: None Pcp    CHIEF COMPLAINT:    Chief Complaint   Patient presents with    Follow - Up     vestibular paroxysmia    Results     Discuss CT and MRI results     History of Present Illness  Navi Portillo is a 49 year old male with Meniere's disease, vestibular migarine who presents for follow-up regarding medication management and symptom control.    He has been managing Meniere's disease for approximately four years, which significantly impacts his daily life. Currently, he is not experiencing any attacks, attributing this to avoiding certain activities. The condition profoundly affects his quality of life, as he cannot function, drive, or engage in normal activities. He is actively seeking treatment options to manage his symptoms and improve his quality of life.    He recently trialed venlafaxine for three days but discontinued it due to adverse effects, including nausea and a sensation of a 'lump in his throat' that made him feel like he was gagging. He did not notice any improvement in his symptoms during this short trial.    He is currently taking dyazide twice daily. His creatinine level has increased from 0.93 to 1.12, which remains within the normal range but is trending upwards. He is concerned about the impact of his condition on his ability to drive safely, fearing he could cause harm if driving.    No history of kidney stones, sulfa drug allergies, or glaucoma. Flying exacerbates his symptoms, causing issues with pressure equalization and resulting in prolonged discomfort.    He has undergone prior diagnostic imaging, including a CT and MRI, both of which were normal. He has not had a series of four intertrypanic injections, which were previously administered non-concurrently.    He leads an active lifestyle and is concerned about the impact of his condition on his ability to maintain this  lifestyle. No nausea, dizziness, headaches, or changes in food preferences aside from the lump in the throat sensation experienced with venlafaxine.      HISTORY OF PRESENT ILLNESS: Navi Portillo is a 49 year old male patient presents for evaluation of Meniere's disease, which he has been experiencing in his right ear for the past three years. The patient had been seen at [facility] in 2021 by Dr. Lorenzo multiple times for Meniere's syndrome and chronic sinusitis. He has undergone an extensive workup by Dr. Silverman, and most recently saw Dr. Menchaca on 12/13/2024, who advised increasing his Dyazide dosage to two tablets per day, and recommended magnesium. He is referred here to consider vestibular migraine.     Patient reports experiencing dizziness that can last from 10 minutes to several hours. He describes the dizziness as feeling as if he is upside down and spinning. Between episodes, he experiences tinnitus and ear pressure. He also reports experiencing extremely brief episodes of vertigo, each lasting around 2 minutes. He also has a tone-like tinnitus when he presses on his right ear.    His most recent audiogram results show sloping sensorineural hearing loss in the right ear. MRI and CT scan results were normal. He has received three steroid injections with no improvement. The patient is currently on diphenhydramine and Dramamine. His only surgery to date has been a nasal septal repair. He has tried magnesium supplements, meclizine, and hydroxyzine all without notable improvement.    PAST MEDICAL HISTORY:    Past Medical History:    Anxiety    Erectile dysfunction, unspecified erectile dysfunction type    Migraine       PAST SURGICAL HISTORY:    Past Surgical History:   Procedure Laterality Date    Other surgical history  about 2013    Deviated Septum Repair    Other surgical history  12/14/2018    Cystoscopy w/ Dr Jones    Sinus surgery        Vasectomy         Medications Ordered Prior to  Encounter[1]    Allergies: Allergies[2]    SOCIAL HISTORY:    Social History     Tobacco Use    Smoking status: Never    Smokeless tobacco: Never   Substance Use Topics    Alcohol use: Yes       Family History   Problem Relation Age of Onset    No Known Problems Father     Other (Other) Mother         migraines       REVIEW OF SYSTEMS:   Positives are in bold  Neuro: Headache, facial weakness, facial numbness, neck pain, vertigo  ENT: Hearing change, tinnitus, otorrhea, otalgia, aural fullness, ear pressure, vertigo, imbalance  Sinus pressure, rhinorrhea, congestion, facial pain, jaw pain, dysphagia, odynophagia, sore throat, voice changes, shortness of breath    EXAMINATION:  I washed my hands with an alcohol-based hand gel prior to examination  Constitutional:   --Vitals: Height 5' 11\" (1.803 m), weight 170 lb (77.1 kg).  --General: no apparent distress, well-developed, conversant  Psych: affect pleasant and appropriate for age, alert and oriented  Neuro: Facial movement normal bilateral  Respiratory: No stridor, stertor or increased work of breathing  ENT:  --Ear: The bilateral ears were examined under binocular microscopy  Right ear microscopic exam:  Pinna: Normal, no lesions or masses.  Mastoid: Nontender on palpation.   External auditory canal: Clear, no masses or lesions.  Tympanic membrane: Intact, no lesions, normal landmarks.  Middle ear: Aerated.    Left ear microscopic exam:  Pinna: Normal, no lesions or masses.  Mastoid: Nontender on palpation.   External auditory canal: Clear, no masses or lesions.  Tympanic membrane: Intact, no lesions, normal landmarks.  Middle ear: Aerated.    CT Remporal Bones - 6/27/2024  IMPRESSION: Essentially negative CT temporal bones survey.    MRI IACs 2/25/2022  Impression   IMPRESSION:  Normal contrast-enhanced MRI of the brain and IACs.     VNG 2/27/2025  Interpretation:  Right unilateral caloric weakness.    Caloric responses of the right ear are 28% weaker than those  of the left ear.  Unilateral weakness measured as greater than 25% is considered to be clinically significant.  Caloric weakness usually indicates a peripheral vestibular lesion involving the lateral canal or its afferent pathways.     Borderline bilateral caloric weakness.    Bilateral weakness is categorized as sum for all 4 caloric responses less than 22 deg  ( Patient was 28)  Or Total responses from each ear less than 12 deg   ( Patient R=10. L=18)     Latest Audiogram Result (Hz) Exam performed: 2/26/2025 3:20 PM Last edited by Jesenia Stubbs Au.D on 2/26/2025 3:30 PM        125 250  1500 2000 3000 4000 6000 8000    Right air:         60  65    Left air:  10 20  15  15 45 40 10 20    Right air (masked):  65 65  65  60  60      Left mastoid bone:   5  15  10 35 45      Right mastoid bone (masked):   50N  60  60  55         Reliability:  Fair    Transducer:  Inserts    Technique:  Conventional Audiometry    Comments:  Negative Mindy 1000z          Latest Speech Audiometry  Last edited by Jesenia Stubbs Au.D on 2/26/2025 3:30 PM       Ear Method PTA SAT SRT [facility] Test/list Score (%) Intensity Mask/noise Notes    right live voice  65    Half-List 12 75  masked    left live voice  20    10 By Difficulty 96 55                    Latest Tympanogram Result       Probe Tone (Hz): 226 Exam performed: 2/26/2025 3:20 PM Last edited by Jesenia Stubbs Au.D on 2/26/2025 3:31 PM      Tympanograms  These were drawn by a user, not generated from device data      Right Ear Left Ear                     Right Ear Left Ear    Tympanogram type: Type A Type C    Canal volume (mL): 1.3 1.2    Peak pressure (daPa): 10 -101    Peak amplitude (mL): 0.8 0.79    Tympanogram width (daPa):        Comments:                    Latest Audiogram and Tympanogram Result Text  Last edited by Jesenia Stubbs Au.D on 2/26/2025  4:03 PM      Addendum      Patient here for testing on referral from Dr. Wall  He is here for  HT/VNG/VEMP today    ECoG testing is scheduled for 3/4/2025    See history and notes on VNG/VEMP report     Otoscopic Inspection:  both ears: no cerumen and TM visualized    Summary  Right: Moderately severe SNHL  Left: WNL with moderate notched SNHL at 3000/4000Hz.    Normal tympanogram for the right ear   Negative pressure tympanogram for the left ear     Follow up with Liam Eng M.D..  Audiological monitoring as needed during the course of medical management.       Addended by Jesenia Stubbs Au.D on 2/26/2025  4:03 PM                ASSESSMENT/PLAN:  Navi Portillo is a 49 year old male with     ICD-10-CM   1. Active cochleovestibular Meniere's disease, right  H81.01   2. Vestibular migraine  G43.809   3. Vertigo  R42   4. Asymmetric SNHL (sensorineural hearing loss)  H90.3   5. Vestibular paroxysmia  H81.8X9        Assessment & Plan  Right Ménière's disease  Right Ménière's disease with chronic migraine and possible vestibular migraine. Symptoms persistent for four years. Previous treatments ineffective. Surgery not recommended due to potential worsening of chronic migraine. Focus on medical management targeting cochleovestibular migraine. Topiramate proposed as next medication. Surgery considered only after exhausting medical options.  - Prescribe topiramate 25 mg at bedtime for one week, then increase to 25 mg twice a day if tolerated.  - Discuss potential side effects of topiramate: numbness, tingling, altered taste, stomach upset, appetite suppression, reduced sweating, mood changes, forgetfulness.  - Consider intertrypanic injections if necessary, emphasize concurrent medical management.    Chronic migraine with possible vestibular migraine  Chronic migraine with possible vestibular migraine contributing to Ménière's disease symptoms. Venlafaxine not tolerated due to severe side effects. Goal to find effective, tolerable medication. Topiramate next step with 50% chance of no side effects,  50% chance of significant side effects.  - Prescribe topiramate as outlined above.  - Monitor for side effects, adjust treatment as necessary.  - -Topiramate - Possible side effects including but not limited to paraesthesias, GI upset, appetite suppression, change in taste, anhydrosis, cognitive side effect (forgetfullness, word finding deficit) discussed. This medication can be stopped without a taper at any time if needed.     Worsening kidney function  Worsening kidney function with triamterene-hydrochlorothiazide. Creatinine increased from 0.93 to 1.12. Medication may contribute to issue. Advised dosage reduction due to risk of further decline.  - Encourage aggressive hydration to support kidney function.  - Monitor kidney function closely.  - Discuss with primary care provider about risks of continued triamterene-hydrochlorothiazide use.      Follow-up in 4 weeks    Situation reviewed with the patient in detail.    Liam Eng MD  Otology/Otolaryngology  [address] [place] [id number]  Phone [phone]  Fax [phone]                 [1]   Current Outpatient Medications on File Prior to Visit   Medication Sig Dispense Refill    venlafaxine ER 37.5 MG Oral Capsule SR 24 Hr Take 1 capsule (37.5 mg total) by mouth daily. 30 capsule 1    triamterene-hydroCHLOROthiazide 37.5-25 MG Oral Cap Take 1 capsule by mouth in the morning and 1 capsule before bedtime. 60 capsule 1    hydrOXYzine 25 MG Oral Tab Take 1 tablet (25 mg total) by mouth. (Patient not taking: Reported on 5/1/2025)      LORazepam 1 MG Oral Tab Take 1 tablet (1 mg total) by mouth. (Patient not taking: Reported on 5/1/2025)      meclizine 25 MG Oral Tab Take 1 tablet (25 mg total) by mouth As Directed. (Patient not taking: Reported on 5/1/2025)      ondansetron (ZOFRAN) 4 mg tablet 1 tablet (4 mg total). (Patient not taking: Reported on 5/1/2025)      SUMAtriptan 50 MG Oral Tab Take 1  tablet (50 mg total) by mouth. (Patient not taking: Reported on 5/1/2025)      Tadalafil 20 MG Oral Tab Take 1 tablet (20 mg total) by mouth daily as needed. (Patient not taking: Reported on 5/1/2025)      Tadalafil, PAH, 20 MG Oral Tab Take 20 mg by mouth daily as needed. (Patient not taking: Reported on 5/1/2025)      tamsulosin (FLOMAX) 0.4 MG Oral Cap 1 capsule (0.4 mg total). (Patient not taking: Reported on 5/1/2025)      cyclobenzaprine 5 MG Oral Tab Take 1 tablet (5 mg total) by mouth nightly as needed for Muscle spasms. (Patient not taking: Reported on 5/1/2025) 10 tablet 0    Sildenafil Citrate 100 MG Oral Tab Take 1/2 TO 1 tablet (100 mg total) by mouth as needed for Erectile Dysfunction. (Patient not taking: Reported on 5/1/2025) 30 tablet 3     No current facility-administered medications on file prior to visit.   [2] No Known Allergies

## 2025-06-05 ENCOUNTER — OFFICE VISIT (OUTPATIENT)
Facility: LOCATION | Age: 50
End: 2025-06-05
Payer: MEDICAID

## 2025-06-05 VITALS — WEIGHT: 170 LBS | HEIGHT: 71 IN | BODY MASS INDEX: 23.8 KG/M2

## 2025-06-05 DIAGNOSIS — H81.01: Primary | ICD-10-CM

## 2025-06-05 DIAGNOSIS — G43.809 VESTIBULAR MIGRAINE: ICD-10-CM

## 2025-06-05 DIAGNOSIS — H90.3 ASYMMETRIC SNHL (SENSORINEURAL HEARING LOSS): ICD-10-CM

## 2025-06-05 PROCEDURE — 99214 OFFICE O/P EST MOD 30 MIN: CPT | Performed by: OTOLARYNGOLOGY

## 2025-06-05 RX ORDER — NORTRIPTYLINE HYDROCHLORIDE 10 MG/1
20 CAPSULE ORAL NIGHTLY
Qty: 60 CAPSULE | Refills: 1 | Status: SHIPPED | OUTPATIENT
Start: 2025-06-05 | End: 2025-06-12

## 2025-06-05 NOTE — PROGRESS NOTES
The following individual(s) verbally consented to be recorded using ambient AI listening technology and understand that they can each withdraw their consent to this listening technology at any point by asking the clinician to turn off or pause the recording:  Yes to consent  Patient name: Navi Portillo

## 2025-06-05 NOTE — PROGRESS NOTES
PROGRESS NOTE  OTOLOGY/OTOLARYNGOLOGY    REF MD:  No referring provider defined for this encounter.     PCP: None Pcp    CHIEF COMPLAINT:    Chief Complaint   Patient presents with    Follow - Up     Patient here for Meniere's disease f/up and medication evaluation.     History of Present Illness  Navi Portillo is a 49 year old male with Meniere's disease who presents with worsening symptoms and medication side effects.    His symptoms worsened with the use of topiramate, which he stopped three weeks ago after experiencing daily headaches for two weeks and significant brain fog. The medication did not improve his symptoms and instead caused additional adverse effects.    He previously took venlafaxine twice daily, which he discontinued due to side effects including stomach upset and a gagging reflex. He is concerned about the long-term use of diuretics and is interested in exploring other treatment options.    He has undergone a series of three steroid injections in the ear for his Meniere's disease, with the first injection providing significant but short-lived relief. The injections were administered weeks apart by a previous doctor, and he recalls that his hearing loss has been documented for about a year, with a significant drop in hearing ability.    He has tried oral prednisone in the past without noticing any improvement in his dizziness. He is scheduled to travel to [place] from June 21st to July 1st and desires a medication that does not cause severe side effects, as previous medications have led to intolerable symptoms such as headaches and brain fog.    He mentions a history of erectile dysfunction, which he attributes to past use of Propecia, noting that it caused permanent dysfunction. He is concerned about potential worsening of this condition with new medications.      HISTORY OF PRESENT ILLNESS: Navi Portillo is a 49 year old male patient presents for evaluation of Meniere's disease, which  he has been experiencing in his right ear for the past three years. The patient had been seen at [facility] in 2021 by Dr. Lorenzo multiple times for Meniere's syndrome and chronic sinusitis. He has undergone an extensive workup by Dr. Silverman, and most recently saw Dr. Menchaca on 12/13/2024, who advised increasing his Dyazide dosage to two tablets per day, and recommended magnesium. He is referred here to consider vestibular migraine.     Patient reports experiencing dizziness that can last from 10 minutes to several hours. He describes the dizziness as feeling as if he is upside down and spinning. Between episodes, he experiences tinnitus and ear pressure. He also reports experiencing extremely brief episodes of vertigo, each lasting around 2 minutes. He also has a tone-like tinnitus when he presses on his right ear.    His most recent audiogram results show sloping sensorineural hearing loss in the right ear. MRI and CT scan results were normal. He has received three steroid injections with no improvement. The patient is currently on diphenhydramine and Dramamine. His only surgery to date has been a nasal septal repair. He has tried magnesium supplements, meclizine, and hydroxyzine all without notable improvement.    PAST MEDICAL HISTORY:    Past Medical History:    Anxiety    Erectile dysfunction, unspecified erectile dysfunction type    Migraine       PAST SURGICAL HISTORY:    Past Surgical History:   Procedure Laterality Date    Other surgical history  about 2013    Deviated Septum Repair    Other surgical history  12/14/2018    Cystoscopy w/ Dr Jones    Sinus surgery        Vasectomy         Medications Ordered Prior to Encounter[1]    Allergies: Allergies[2]    SOCIAL HISTORY:    Social History     Tobacco Use    Smoking status: Never    Smokeless tobacco: Never   Substance Use Topics    Alcohol use: Not Currently       Family History   Problem Relation Age of Onset    No Known Problems Father     Other  (Other) Mother         migraines       REVIEW OF SYSTEMS:   Positives are in bold  Neuro: Headache, facial weakness, facial numbness, neck pain, vertigo  ENT: Hearing change, tinnitus, otorrhea, otalgia, aural fullness, ear pressure, vertigo, imbalance  Sinus pressure, rhinorrhea, congestion, facial pain, jaw pain, dysphagia, odynophagia, sore throat, voice changes, shortness of breath    EXAMINATION:  I washed my hands with an alcohol-based hand gel prior to examination  Constitutional:   --Vitals: Height 5' 11\" (1.803 m), weight 170 lb (77.1 kg).  --General: no apparent distress, well-developed, conversant  Psych: affect pleasant and appropriate for age, alert and oriented  Neuro: Facial movement normal bilateral  Respiratory: No stridor, stertor or increased work of breathing  ENT:  --Ear: The bilateral ears were examined under binocular microscopy  Right ear microscopic exam:  Pinna: Normal, no lesions or masses.  Mastoid: Nontender on palpation.   External auditory canal: Clear, no masses or lesions.  Tympanic membrane: Intact, no lesions, normal landmarks.  Middle ear: Aerated.    Left ear microscopic exam:  Pinna: Normal, no lesions or masses.  Mastoid: Nontender on palpation.   External auditory canal: Clear, no masses or lesions.  Tympanic membrane: Intact, no lesions, normal landmarks.  Middle ear: Aerated.    CT Remporal Bones - 6/27/2024  IMPRESSION: Essentially negative CT temporal bones survey.    MRI IACs 2/25/2022  Impression   IMPRESSION:  Normal contrast-enhanced MRI of the brain and IACs.     VNG 2/27/2025  Interpretation:  Right unilateral caloric weakness.    Caloric responses of the right ear are 28% weaker than those of the left ear.  Unilateral weakness measured as greater than 25% is considered to be clinically significant.  Caloric weakness usually indicates a peripheral vestibular lesion involving the lateral canal or its afferent pathways.     Borderline bilateral caloric weakness.     Bilateral weakness is categorized as sum for all 4 caloric responses less than 22 deg  ( Patient was 28)  Or Total responses from each ear less than 12 deg   ( Patient R=10. L=18)     Latest Audiogram Result (Hz) Exam performed: 2/26/2025 3:20 PM Last edited by Jesenia Stubbs Au.D on 2/26/2025 3:30 PM        125 250  1500 2000 3000 4000 6000 8000    Right air:         60  65    Left air:  10 20  15  15 45 40 10 20    Right air (masked):  65 65  65  60  60      Left mastoid bone:   5  15  10 35 45      Right mastoid bone (masked):   50N  60  60  55         Reliability:  Fair    Transducer:  Inserts    Technique:  Conventional Audiometry    Comments:  Negative Mindy 1000z          Latest Speech Audiometry  Last edited by Jesenia Stubbs Au.D on 2/26/2025 3:30 PM       Ear Method PTA SAT SRT [facility] Test/list Score (%) Intensity Mask/noise Notes    right live voice  65    Half-List 12 75  masked    left live voice  20    10 By Difficulty 96 55                    Latest Tympanogram Result       Probe Tone (Hz): 226 Exam performed: 2/26/2025 3:20 PM Last edited by Jesenia Stubbs Au.D on 2/26/2025 3:31 PM      Tympanograms  These were drawn by a user, not generated from device data      Right Ear Left Ear                     Right Ear Left Ear    Tympanogram type: Type A Type C    Canal volume (mL): 1.3 1.2    Peak pressure (daPa): 10 -101    Peak amplitude (mL): 0.8 0.79    Tympanogram width (daPa):        Comments:                    Latest Audiogram and Tympanogram Result Text  Last edited by Jesenia Stubbs Au.D on 2/26/2025  4:03 PM      Addendum      Patient here for testing on referral from Dr. Wall  He is here for HT/VNG/VEMP today    ECoG testing is scheduled for 3/4/2025    See history and notes on VNG/VEMP report     Otoscopic Inspection:  both ears: no cerumen and TM visualized    Summary  Right: Moderately severe SNHL  Left: WNL with moderate notched SNHL at 3000/4000Hz.    Normal  tympanogram for the right ear   Negative pressure tympanogram for the left ear     Follow up with Liam Eng M.D..  Audiological monitoring as needed during the course of medical management.       Addended by Jesenia Stubbs Au.D on 2/26/2025  4:03 PM                ASSESSMENT/PLAN:  Navi Portillo is a 49 year old male with     ICD-10-CM   1. Active cochleovestibular Meniere's disease, right  H81.01   2. Vestibular migraine  G43.809   3. Asymmetric SNHL (sensorineural hearing loss)  H90.3        Assessment & Plan  Right Ménière's disease  Persistent symptoms despite current treatment. Limited benefit from systemic steroids. Considering intratympanic steroid injections. Surgical options if injections fail. Gentamicin not recommended due to balance issues.  - Consider intratympanic steroid injections (dexamethasone) after return from travel.  - Discuss surgical options (ELSD) if injections are ineffective.    Chronic migraine with possible vestibular migraine  Contributing to Ménière's symptoms. Previous medications not tolerated. Nortriptyline proposed as next option.  - Prescribe nortriptyline, starting at 10 mg at night, increasing to 20 mg if tolerated.  - Monitor for side effects such as constipation, dry mouth, vivid dreams, increased heart rate, or blood pressure.  - Discontinue if intolerable side effects occur.  - Consider injections for Ménière's disease if medication is ineffective.    Right moderately severe sensorineural hearing loss  Unlikely to improve given duration and severity.    Erectile dysfunction  Potentially exacerbated by medications. Nortriptyline may worsen condition.  - Monitor for worsening of erectile dysfunction with nortriptyline use.  - Discontinue nortriptyline if erectile dysfunction worsens significantly.    Follow-up in 4 weeks    Situation reviewed with the patient in detail.    Liam Eng MD  Otology/Otolaryngology  [facility]    [address] [facility] [address] [place] [address]  Phone [phone]  Fax [phone]                 [1]   Current Outpatient Medications on File Prior to Visit   Medication Sig Dispense Refill    topiramate 25 MG Oral Tab Take 1 tablet (25 mg total) by mouth 2 (two) times daily. 60 tablet 1    triamterene-hydroCHLOROthiazide 37.5-25 MG Oral Cap Take 1 capsule by mouth in the morning and 1 capsule before bedtime. 60 capsule 1     No current facility-administered medications on file prior to visit.   [2] No Known Allergies

## 2025-06-12 ENCOUNTER — LAB ENCOUNTER (OUTPATIENT)
Dept: LAB | Age: 50
End: 2025-06-12
Attending: NURSE PRACTITIONER
Payer: MEDICAID

## 2025-06-12 ENCOUNTER — OFFICE VISIT (OUTPATIENT)
Facility: LOCATION | Age: 50
End: 2025-06-12
Payer: MEDICAID

## 2025-06-12 VITALS
HEIGHT: 71 IN | WEIGHT: 167 LBS | OXYGEN SATURATION: 96 % | HEART RATE: 80 BPM | DIASTOLIC BLOOD PRESSURE: 82 MMHG | BODY MASS INDEX: 23.38 KG/M2 | SYSTOLIC BLOOD PRESSURE: 145 MMHG | RESPIRATION RATE: 18 BRPM

## 2025-06-12 DIAGNOSIS — N13.8 BPH WITH OBSTRUCTION/LOWER URINARY TRACT SYMPTOMS: ICD-10-CM

## 2025-06-12 DIAGNOSIS — Z00.00 ANNUAL PHYSICAL EXAM: Primary | ICD-10-CM

## 2025-06-12 DIAGNOSIS — Z12.11 ENCOUNTER FOR COLORECTAL CANCER SCREENING: ICD-10-CM

## 2025-06-12 DIAGNOSIS — I10 HYPERTENSION WITH GOAL BLOOD PRESSURE LESS THAN 130/80: ICD-10-CM

## 2025-06-12 DIAGNOSIS — H81.01: ICD-10-CM

## 2025-06-12 DIAGNOSIS — N52.9 ERECTILE DYSFUNCTION, UNSPECIFIED ERECTILE DYSFUNCTION TYPE: ICD-10-CM

## 2025-06-12 DIAGNOSIS — Z00.00 ANNUAL PHYSICAL EXAM: ICD-10-CM

## 2025-06-12 DIAGNOSIS — N40.1 BPH WITH OBSTRUCTION/LOWER URINARY TRACT SYMPTOMS: ICD-10-CM

## 2025-06-12 DIAGNOSIS — Z12.5 SCREENING FOR PROSTATE CANCER: ICD-10-CM

## 2025-06-12 DIAGNOSIS — Z12.12 ENCOUNTER FOR COLORECTAL CANCER SCREENING: ICD-10-CM

## 2025-06-12 DIAGNOSIS — E55.9 VITAMIN D DEFICIENCY: ICD-10-CM

## 2025-06-12 LAB
ALBUMIN SERPL-MCNC: 4.9 G/DL (ref 3.2–4.8)
ALBUMIN/GLOB SERPL: 2 {RATIO} (ref 1–2)
ALP LIVER SERPL-CCNC: 70 U/L (ref 45–117)
ALT SERPL-CCNC: 29 U/L (ref 10–49)
ANION GAP SERPL CALC-SCNC: 9 MMOL/L (ref 0–18)
AST SERPL-CCNC: 32 U/L (ref ?–34)
BILIRUB SERPL-MCNC: 0.5 MG/DL (ref 0.3–1.2)
BUN BLD-MCNC: 27 MG/DL (ref 9–23)
BUN/CREAT SERPL: 21.6 (ref 10–20)
CALCIUM BLD-MCNC: 9.6 MG/DL (ref 8.7–10.4)
CHLORIDE SERPL-SCNC: 98 MMOL/L (ref 98–112)
CHOLEST SERPL-MCNC: 198 MG/DL (ref ?–200)
CO2 SERPL-SCNC: 29 MMOL/L (ref 21–32)
COMPLEXED PSA SERPL-MCNC: 1.47 NG/ML (ref ?–4)
CREAT BLD-MCNC: 1.25 MG/DL (ref 0.7–1.3)
EGFRCR SERPLBLD CKD-EPI 2021: 70 ML/MIN/1.73M2 (ref 60–?)
EST. AVERAGE GLUCOSE BLD GHB EST-MCNC: 111 MG/DL (ref 68–126)
FASTING PATIENT LIPID ANSWER: NO
FASTING STATUS PATIENT QL REPORTED: NO
GLOBULIN PLAS-MCNC: 2.4 G/DL (ref 2–3.5)
GLUCOSE BLD-MCNC: 95 MG/DL (ref 70–99)
HBA1C MFR BLD: 5.5 % (ref ?–5.7)
HDLC SERPL-MCNC: 31 MG/DL (ref 40–59)
LDLC SERPL CALC-MCNC: 117 MG/DL (ref ?–100)
NONHDLC SERPL-MCNC: 167 MG/DL (ref ?–130)
OSMOLALITY SERPL CALC.SUM OF ELEC: 287 MOSM/KG (ref 275–295)
POTASSIUM SERPL-SCNC: 3.6 MMOL/L (ref 3.5–5.1)
PROT SERPL-MCNC: 7.3 G/DL (ref 5.7–8.2)
SODIUM SERPL-SCNC: 136 MMOL/L (ref 136–145)
TRIGL SERPL-MCNC: 284 MG/DL (ref 30–149)
TSI SER-ACNC: 2.59 UIU/ML (ref 0.55–4.78)
VIT D+METAB SERPL-MCNC: 56.6 NG/ML (ref 30–100)
VLDLC SERPL CALC-MCNC: 50 MG/DL (ref 0–30)

## 2025-06-12 PROCEDURE — 36415 COLL VENOUS BLD VENIPUNCTURE: CPT

## 2025-06-12 PROCEDURE — 85060 BLOOD SMEAR INTERPRETATION: CPT

## 2025-06-12 PROCEDURE — 80053 COMPREHEN METABOLIC PANEL: CPT

## 2025-06-12 PROCEDURE — 82306 VITAMIN D 25 HYDROXY: CPT

## 2025-06-12 PROCEDURE — 83036 HEMOGLOBIN GLYCOSYLATED A1C: CPT

## 2025-06-12 PROCEDURE — 84443 ASSAY THYROID STIM HORMONE: CPT

## 2025-06-12 PROCEDURE — 85025 COMPLETE CBC W/AUTO DIFF WBC: CPT

## 2025-06-12 PROCEDURE — 80061 LIPID PANEL: CPT

## 2025-06-12 PROCEDURE — 99386 PREV VISIT NEW AGE 40-64: CPT | Performed by: NURSE PRACTITIONER

## 2025-06-12 RX ORDER — TADALAFIL 10 MG/1
10 TABLET ORAL
Qty: 30 TABLET | Refills: 1 | Status: SHIPPED | OUTPATIENT
Start: 2025-06-12

## 2025-06-12 NOTE — PATIENT INSTRUCTIONS
VISIT SUMMARY:  Today, you had your annual physical exam and discussed your health history and current conditions. We reviewed your elevated cholesterol, hypertension, Meniere's disease, and slightly enlarged prostate. We also discussed your medication needs and general health maintenance.    YOUR PLAN:  -HYPERTENSION: Hypertension means high blood pressure. Your blood pressure was 145/82 mmHg today, which is higher than the goal of 130/80 mmHg. We recommend you measure your blood pressure at home daily in the morning and log the readings. This will help us determine if you need additional medication.    -MENIERE'S DISEASE: Meniere's disease is a disorder of the inner ear that can cause dizziness and hearing loss. You are currently managing it with triamterene hydrochlorothiazide and topiramate.    -ELEVATED CREATININE: Elevated creatinine can indicate kidney issues. Your creatinine levels are trending upwards but are still within the normal range. We will monitor this due to your use of diuretics and have ordered a chemistry panel to recheck your levels.    -ENLARGED PROSTATE: An enlarged prostate can cause urinary issues, but you have not experienced significant symptoms. Your PSA levels have been low. We will continue to monitor this and have ordered a PSA test. Please report any worsening urinary symptoms.    -GENERAL HEALTH MAINTENANCE: We are conducting a cardiovascular risk assessment to keep your cardiac risk below 5%. We have ordered screening labs for kidney, liver, electrolytes, prostate level, cholesterol, thyroid, diabetes, and vitamin D. We also recommend scheduling a colonoscopy with Dr. Rao or a group provider. Your tadalafil prescription has been sent to the pharmacy. We will communicate your lab results and cardiovascular risk assessment via [employer] within three days.    INSTRUCTIONS:  Please measure your blood pressure at home daily in the morning and log the readings. Schedule a colonoscopy  with Dr. Rao or a group provider. We will communicate your lab results and cardiovascular risk assessment via [employer] within three days.    Contains text generated by Zander

## 2025-06-12 NOTE — PROGRESS NOTES
History of Present Illness         The following individual(s) verbally consented to be recorded using ambient AI listening technology and understand that they can each withdraw their consent to this listening technology at any point by asking the clinician to turn off or pause the recording:    Patient name: Navi Portillo  Additional names:  none          Patient ID: Navi Portillo is a 49 year old male.  Chief Complaint: Physical (Colonoscopy referral /Refill for Cialis )      Navi Portillo is a pleasant 49 year old male who presents to establish care with new provider and for annual physical exam. aNvi Portillo is doing well today.  History of Present Illness  Navi Portillo is a 49 year old male who presents for a new primary care provider and annual physical exam.    He has a history of elevated cholesterol, with the last lab result from four years ago showing elevated levels. He recalls having labs done two and a half years ago for life insurance purposes, which showed a substantial decrease in cholesterol levels. He maintains a healthy diet and exercises regularly, although he has a family history of high cholesterol.    He has a history of hypertension, with blood pressure readings typically around 145/80 mmHg. He is currently taking triamterene hydrochlorothiazide, primarily for Meniere's disease, which helps prevent multiple attacks. He does not monitor his blood pressure at home but notes that it has been consistently high during doctor visits.    He has Meniere's disease and is on triamterene hydrochlorothiazide to manage symptoms. He experiences dizziness with activities such as running, hinging, and jumping, which has led him to stop running and focus on weightlifting instead. He was recently started on topiramate for Meniere's disease.    He is taking tadalafil and requests a refill, noting that it has always been covered by insurance. He does not take Flomax or any other  medications.    No issues with urination or starting the stream, despite having a slightly enlarged prostate. He attributes this to his occupation as a , which he believes contributes to the condition. His PSA levels have always been low, and he has not experienced worsening symptoms.        Health Maintenance  - All care gaps addressed with patient.   Health Maintenance Due   Topic Date Due    Annual Physical  Never done    Colorectal Cancer Screening  Never done    DTaP,Tdap,and Td Vaccines (1 - Tdap) Never done    COVID-19 Vaccine (1 - 2024-25 season) Never done    PSA  12/06/2024    Pneumococcal Vaccine: 50+ Years (1 of 1 - PCV) Never done    Zoster Vaccines (1 of 2) Never done    Annual Depression Screening  01/01/2025       Colonoscopy (45+ or Screening of first-degree relatives of CRC patients is recommended to begin at age 40 or 10 years before the age at diagnosis of the youngest relative diagnosed with CRC): referral placed for GI for colon screening.        Review of Systems  Review of Systems    Physical Exam  Vitals:    06/12/25 1621   BP: 145/82   Pulse: 80   Resp: 18   SpO2: 96%   Weight: 167 lb (75.8 kg)   Height: 5' 11\" (1.803 m)     Body mass index is 23.29 kg/m².  BP Readings from Last 3 Encounters:   06/12/25 145/82   02/04/21 110/72   11/21/19 128/75     Physical Exam  Physical Exam  VITALS: BP- 145/80  GENERAL: Alert, cooperative, well developed, no acute distress.  HEENT: Normocephalic, normal oropharynx, moist mucous membranes.  CHEST: Clear to auscultation bilaterally, no wheezes, rhonchi, or crackles.  CARDIOVASCULAR: Normal heart rate and rhythm, S1 and S2 normal without murmurs.  ABDOMEN: Soft, non-tender, non-distended, without organomegaly, normal bowel sounds.  EXTREMITIES: No cyanosis or edema.  NEUROLOGICAL: Cranial nerves grossly intact, moves all extremities without gross motor or sensory deficit.        Labs & Imaging  Pertinent labs and imaging reviewed.   Lab Results    Component Value Date    GLU 89 03/20/2025    BUN 28 (H) 03/20/2025    BUNCREA 25.0 (H) 03/20/2025    CREATSERUM 1.12 03/20/2025    ANIONGAP 3 03/20/2025    CA 9.4 03/20/2025    OSMOCALC 289 03/20/2025    ALKPHO 50 02/04/2021    AST 34 02/04/2021    ALT 24 02/04/2021    BILT 0.30 02/04/2021    TP 7.3 02/04/2021    ALB 4.6 02/04/2021     03/20/2025    K 5.0 03/20/2025     03/20/2025    CO2 33.0 (H) 03/20/2025     Lab Results   Component Value Date     02/04/2021    A1C 5.1 02/04/2021     Lab Results   Component Value Date    WBC 5.96 02/04/2021    RBC 5.49 02/04/2021    HGB 13.8 02/04/2021    HCT 42.4 02/04/2021    MCV 77.2 (L) 02/04/2021    MCH 25.1 (L) 02/04/2021    MCHC 32.5 02/04/2021    RDW 16.7 (H) 02/04/2021     02/04/2021     Lab Results   Component Value Date    CHOLEST 211.00 (H) 02/04/2021    TRIG 148.00 02/04/2021    HDL 6 (L) 02/04/2021     (H) 02/04/2021    VLDL 30 02/04/2021     The ASCVD Risk score (Crystal DK, et al., 2019) failed to calculate for the following reasons:    Cannot find a previous HDL lab    Cannot find a previous total cholesterol lab    Results  LABS  Cholesterol: elevated (2023)      Medical History    Reviewed allergies:  Allergies[1]     Reviewed:  Patient Active Problem List    Diagnosis    Hypertension with goal blood pressure less than 130/80    Asymmetrical sensorineural hearing loss    Anxiety    Elevated blood sugar    Erectile dysfunction, unspecified erectile dysfunction type    BPH with obstruction/lower urinary tract symptoms    Chronic mixed headache syndrome    Anxiety states    Migraine      Reviewed:  Past Medical History[2]   Reviewed:  Family History[3]    Reviewed:  Past Surgical History[4]   Reviewed:  Short Social Hx on File[5]   Reviewed:  Current Medications[6]       Assessment & Plan    1. Annual physical exam  - [facility] - INTERNAL  - PSA Total, Screen; Future  - Lipid Panel; Future  - TSH W Reflex To Free T4; Future  -  Hemoglobin A1C; Future  - Comp Metabolic Panel (14); Future  - CBC With Differential With Platelet; Future  - Vitamin D; Future    2. Encounter for colorectal cancer screening  - [facility] - INTERNAL    3. Screening for prostate cancer  - PSA Total, Screen; Future    4. Vitamin D deficiency  - Vitamin D; Future    5. Erectile dysfunction, unspecified erectile dysfunction type  - Tadalafil 10 MG Oral Tab; Take 1 tablet (10 mg total) by mouth daily as needed for Erectile Dysfunction.  Dispense: 30 tablet; Refill: 1    6. BPH with obstruction/lower urinary tract symptoms  - Tadalafil 10 MG Oral Tab; Take 1 tablet (10 mg total) by mouth daily as needed for Erectile Dysfunction.  Dispense: 30 tablet; Refill: 1    7. Hypertension with goal blood pressure less than 130/80    8. Active cochleovestibular Meniere's disease, right      Assessment & Plan  Hypertension  Blood pressure elevated at 145/80 mmHg despite triamterene hydrochlorothiazide. Possible white coat hypertension. Home monitoring recommended to assess true levels. Goal: 130/80 mmHg or lower. Needs COD physical and will follow up after physical completed if needed.  - Measure blood pressure at home daily in the morning, log readings.  - Evaluate home readings to determine need for additional antihypertensive medication.    Meniere's disease  Managed with triamterene hydrochlorothiazide and topiramate.    Elevated creatinine  Creatinine trending upwards but within normal range. Monitor due to diuretic use.  - Order chemistry panel to recheck creatinine levels.    Enlarged prostate  Slightly enlarged prostate, no significant urinary symptoms. Low PSA levels historically. Monitoring planned.  - Order PSA test.  - Advise to report worsening urinary symptoms for potential urology referral.    General Health Maintenance  Cardiovascular risk assessment ongoing. Goal: maintain cardiac risk below 5%.  - Order screening labs: kidney, liver, electrolytes, prostate level,  cholesterol, thyroid, diabetes, vitamin D.  - Refer for colonoscopy, schedule with Dr. Rao or group provider.  - Send tadalafil prescription to pharmacy.  - Communicate lab results and cardiovascular risk assessment via [facility] within three days.        Follow Up:   Return in about 4 weeks (around 7/10/2025) for BLOOD PRESSURE; TRACK AT HOME, BRING RESULTS.      MARGARITO Sandhu  Internal Medicine    I spent 35 minutes obtaining pertitent medical history, reviewing pertinent imaging/labs and specialists notes, evaluating patient, discussing differential diagnosis' and various treatment options, reinforcing importance of compliance with treatment plan, and completing documentation.     Encounter Times  PreChartin minutes    Reviewing/Obtainin minutes      Medical Exam: 5 minutes    Plan: 5 minutes      Notes: 5 minutes    Counseling/Education: 5 minutes      Referring/Communicatin minutes    Ind Interpretation:   minutes      Care Coordination:   minutes       My total time spent caring for the patient on the day of the encounter: 35 minutes.          Patient asked to sign release of information for outside records if not already requested, make future office/imaging appointments at the  prior to leaving, and to sign up for [employer] [facility] if not already active.  Preventive measures and further education discussed with patient as per after visit summary. Potential medication side effects discussed. All questions answered to best of ability.   Call office with any questions. Seek emergency care if necessary.   Patient understands and agrees to follow directions and advice.      ----------------------------------------- PATIENT INSTRUCTIONS-----------------------------------------     Patient Instructions   VISIT SUMMARY:  Today, you had your annual physical exam and discussed your health history and current conditions. We reviewed your elevated cholesterol, hypertension, Meniere's disease, and slightly  enlarged prostate. We also discussed your medication needs and general health maintenance.    YOUR PLAN:  -HYPERTENSION: Hypertension means high blood pressure. Your blood pressure was 145/82 mmHg today, which is higher than the goal of 130/80 mmHg. We recommend you measure your blood pressure at home daily in the morning and log the readings. This will help us determine if you need additional medication.    -MENIERE'S DISEASE: Meniere's disease is a disorder of the inner ear that can cause dizziness and hearing loss. You are currently managing it with triamterene hydrochlorothiazide and topiramate.    -ELEVATED CREATININE: Elevated creatinine can indicate kidney issues. Your creatinine levels are trending upwards but are still within the normal range. We will monitor this due to your use of diuretics and have ordered a chemistry panel to recheck your levels.    -ENLARGED PROSTATE: An enlarged prostate can cause urinary issues, but you have not experienced significant symptoms. Your PSA levels have been low. We will continue to monitor this and have ordered a PSA test. Please report any worsening urinary symptoms.    -GENERAL HEALTH MAINTENANCE: We are conducting a cardiovascular risk assessment to keep your cardiac risk below 5%. We have ordered screening labs for kidney, liver, electrolytes, prostate level, cholesterol, thyroid, diabetes, and vitamin D. We also recommend scheduling a colonoscopy with Dr. Rao or a group provider. Your tadalafil prescription has been sent to the pharmacy. We will communicate your lab results and cardiovascular risk assessment via [employer] within three days.    INSTRUCTIONS:  Please measure your blood pressure at home daily in the morning and log the readings. Schedule a colonoscopy with Dr. Rao or a group provider. We will communicate your lab results and cardiovascular risk assessment via [employer] within three days.    Contains text generated by Zander          The 21st  Century Cures Act makes medical notes available to patients in the interest of transparency.  However, please be advised that this is a medical document.  It is intended as a peer to peer communication.  It is written in medical language and may contain abbreviations or verbiage that are technical and unfamiliar.  It may appear blunt or direct.  Medical documents are intended to carry relevant information, facts as evident, and the clinical opinion of the practitioner.          [1] No Known Allergies  [2]   Past Medical History:   Anxiety    Erectile dysfunction, unspecified erectile dysfunction type    Migraine   [3]   Family History  Problem Relation Age of Onset    No Known Problems Father     Other (Other) Mother         migraines   [4]   Past Surgical History:  Procedure Laterality Date    Other surgical history  about 2013    Deviated Septum Repair    Other surgical history  12/14/2018    Cystoscopy w/ Dr Jones    Sinus surgery        Vasectomy     [5]   Social History  Socioeconomic History    Marital status:    Tobacco Use    Smoking status: Never    Smokeless tobacco: Never   Vaping Use    Vaping status: Never Used   Substance and Sexual Activity    Alcohol use: Not Currently    Drug use: Never     Social Drivers of Health     Food Insecurity: Unknown (6/12/2025)    NCSS - Food Insecurity     Ran Out of Food in the Last Year: No   Transportation Needs: No Transportation Needs (6/12/2025)    NCSS - Transportation     Lack of Transportation: No   Housing Stability: Not At Risk (6/12/2025)    NCSS - Housing/Utilities     Has Housing: Yes     Worried About Losing Housing: No     Unable to Get Utilities: No   [6]   Current Outpatient Medications   Medication Sig Dispense Refill    Tadalafil 10 MG Oral Tab Take 1 tablet (10 mg total) by mouth daily as needed for Erectile Dysfunction. 30 tablet 1    topiramate 25 MG Oral Tab Take 1 tablet (25 mg total) by mouth 2 (two) times daily. 60 tablet 1     triamterene-hydroCHLOROthiazide 37.5-25 MG Oral Cap Take 1 capsule by mouth in the morning and 1 capsule before bedtime. 60 capsule 1

## 2025-06-13 LAB
BASOPHILS # BLD AUTO: 0.04 X10(3) UL (ref 0–0.2)
BASOPHILS NFR BLD AUTO: 0.6 %
DEPRECATED RDW RBC AUTO: 41.5 FL (ref 35.1–46.3)
EOSINOPHIL # BLD AUTO: 0.2 X10(3) UL (ref 0–0.7)
EOSINOPHIL NFR BLD AUTO: 3 %
ERYTHROCYTE [DISTWIDTH] IN BLOOD BY AUTOMATED COUNT: 13.9 % (ref 11–15)
HCT VFR BLD AUTO: 52.9 % (ref 39–53)
HGB BLD-MCNC: 17.6 G/DL (ref 13–17.5)
IMM GRANULOCYTES # BLD AUTO: 0.06 X10(3) UL (ref 0–1)
IMM GRANULOCYTES NFR BLD: 0.9 %
LYMPHOCYTES # BLD AUTO: 1.6 X10(3) UL (ref 1–4)
LYMPHOCYTES NFR BLD AUTO: 24 %
MCH RBC QN AUTO: 27.4 PG (ref 26–34)
MCHC RBC AUTO-ENTMCNC: 33.3 G/DL (ref 31–37)
MCV RBC AUTO: 82.4 FL (ref 80–100)
MONOCYTES # BLD AUTO: 0.82 X10(3) UL (ref 0.1–1)
MONOCYTES NFR BLD AUTO: 12.3 %
NEUTROPHILS # BLD AUTO: 3.94 X10 (3) UL (ref 1.5–7.7)
NEUTROPHILS # BLD AUTO: 3.94 X10(3) UL (ref 1.5–7.7)
NEUTROPHILS NFR BLD AUTO: 59.2 %
PLATELET # BLD AUTO: 176 10(3)UL (ref 150–450)
PLATELETS.RETICULATED NFR BLD AUTO: 10.2 % (ref 0–7)
RBC # BLD AUTO: 6.42 X10(6)UL (ref 4.3–5.7)
WBC # BLD AUTO: 6.7 X10(3) UL (ref 4–11)

## 2025-07-10 ENCOUNTER — TELEPHONE (OUTPATIENT)
Facility: CLINIC | Age: 50
End: 2025-07-10

## 2025-07-10 ENCOUNTER — LAB ENCOUNTER (OUTPATIENT)
Dept: LAB | Age: 50
End: 2025-07-10
Attending: NURSE PRACTITIONER
Payer: MEDICAID

## 2025-07-10 ENCOUNTER — OFFICE VISIT (OUTPATIENT)
Facility: LOCATION | Age: 50
End: 2025-07-10
Payer: MEDICAID

## 2025-07-10 ENCOUNTER — OFFICE VISIT (OUTPATIENT)
Facility: CLINIC | Age: 50
End: 2025-07-10
Payer: MEDICAID

## 2025-07-10 VITALS
SYSTOLIC BLOOD PRESSURE: 159 MMHG | DIASTOLIC BLOOD PRESSURE: 75 MMHG | HEART RATE: 81 BPM | RESPIRATION RATE: 18 BRPM | BODY MASS INDEX: 23 KG/M2 | OXYGEN SATURATION: 97 % | HEIGHT: 71 IN

## 2025-07-10 VITALS
HEIGHT: 71 IN | SYSTOLIC BLOOD PRESSURE: 128 MMHG | DIASTOLIC BLOOD PRESSURE: 78 MMHG | HEART RATE: 82 BPM | BODY MASS INDEX: 22.82 KG/M2 | WEIGHT: 163 LBS

## 2025-07-10 VITALS — BODY MASS INDEX: 22.82 KG/M2 | HEIGHT: 71 IN | WEIGHT: 163 LBS

## 2025-07-10 DIAGNOSIS — K62.5 BRBPR (BRIGHT RED BLOOD PER RECTUM): ICD-10-CM

## 2025-07-10 DIAGNOSIS — I10 HYPERTENSION WITH GOAL BLOOD PRESSURE LESS THAN 130/80: Primary | ICD-10-CM

## 2025-07-10 DIAGNOSIS — Z12.11 COLON CANCER SCREENING: Primary | ICD-10-CM

## 2025-07-10 DIAGNOSIS — Z13.6 ENCOUNTER FOR SCREENING FOR CORONARY ARTERY DISEASE: ICD-10-CM

## 2025-07-10 DIAGNOSIS — H90.3 ASYMMETRIC SNHL (SENSORINEURAL HEARING LOSS): ICD-10-CM

## 2025-07-10 DIAGNOSIS — E78.5 DYSLIPIDEMIA: ICD-10-CM

## 2025-07-10 DIAGNOSIS — H81.01: Primary | ICD-10-CM

## 2025-07-10 DIAGNOSIS — G43.809 VESTIBULAR MIGRAINE: ICD-10-CM

## 2025-07-10 PROBLEM — H81.09 MENIERE'S DISEASE: Status: ACTIVE | Noted: 2024-03-07

## 2025-07-10 LAB
CHOLEST SERPL-MCNC: 200 MG/DL (ref ?–200)
FASTING PATIENT LIPID ANSWER: YES
HDLC SERPL-MCNC: 32 MG/DL (ref 40–59)
LDLC SERPL CALC-MCNC: 151 MG/DL (ref ?–100)
NONHDLC SERPL-MCNC: 168 MG/DL (ref ?–130)
TRIGL SERPL-MCNC: 94 MG/DL (ref 30–149)
VLDLC SERPL CALC-MCNC: 18 MG/DL (ref 0–30)

## 2025-07-10 PROCEDURE — 99214 OFFICE O/P EST MOD 30 MIN: CPT | Performed by: NURSE PRACTITIONER

## 2025-07-10 PROCEDURE — 99213 OFFICE O/P EST LOW 20 MIN: CPT | Performed by: OTOLARYNGOLOGY

## 2025-07-10 PROCEDURE — 99214 OFFICE O/P EST MOD 30 MIN: CPT

## 2025-07-10 PROCEDURE — 36415 COLL VENOUS BLD VENIPUNCTURE: CPT

## 2025-07-10 PROCEDURE — 80061 LIPID PANEL: CPT

## 2025-07-10 NOTE — TELEPHONE ENCOUNTER
Patient was seen in office today by MARGARITO Dunlap. Please contact patient to schedule procedure per orders below:     1. Schedule colonoscopy with General Pool Endoscopist  Diagnosis: CRC screen, BRBPR  Sedation: MAC or IV  Prep: split dose golytely     Medication changes:      *please read bowel preparation handout for complete list of medication adjustments*  14 Days Before Procedure   STOP all vitamins and supplements, including: Iron, multivitamins, vitamin E, herbal/mineral supplements, and over-the-counter diet medications    3 Days Before Procedure   STOP any erectile dysfunction medications (e.g., Viagra, Cialis, Levitra, Slendra)    Day of Procedure   STOP all diuretics  (e.g. hydrochlorothiazide, furosemide, spironolactone)    24 Hours Before Procedure   DO NOT CONSUME any alcohol or use recreational drugs         Thank you!

## 2025-07-10 NOTE — H&P
[facility]                                                                                                  Clinic History and Physical     Chief Complaint   Patient presents with    Consult     Colonoscopy       Requesting physician or provider: None Pcp    HPI:   Navi Portillo is a 49 year old year-old male with active diagnoses including meniere's disease. Prior medical/surgical hx in note table. The patient presents for colon cancer screening evaluation.    #BRBPR  -a couple times a year notices drop of blood on tissue when wiping after bowel movement    Patient is here today as a referral from his PCP for evaluation prior to undergoing colonoscopy for CRC screening. Patient denies any GI symptoms of nausea, vomiting, heartburn, dyspepsia, dysphagia, hematemesis, abdominal pain, change in bowel habits, constipation, diarrhea, or melena.  Additionally there is no unintentional weight loss.    Pt is due for CRC screening. We discussed the available screening options for CRC such as FIT and cologuard. They are electing to pursue colonoscopy at this time.     Last colonoscopy: none   Last EGD:  none     NSAIDS: none   Tobacco: none   Alcohol: none   Marijuana: none   Illicit drugs: none     FH GI malignancy: none     No history of adverse reaction to sedation  No CHEMA  No anticoagulants/antiplatelet  No pacemaker/defibrillator  No pain medications and/or sleep aides    Wt Readings from Last 6 Encounters:   07/10/25 163 lb (73.9 kg)   06/12/25 167 lb (75.8 kg)   06/05/25 170 lb (77.1 kg)   05/01/25 170 lb (77.1 kg)   03/20/25 170 lb (77.1 kg)   01/23/25 170 lb (77.1 kg)          History, Medications, Allergies, ROS:      Past Medical History[1]   Past Surgical History[2]   Family Hx: Family History[3]   Social History: Short Social Hx on File[4]     Medications (Active prior to today's visit):  Current Medications[5]    Allergies:  Allergies[6]    ROS:   CONSTITUTIONAL: negative for fevers,  chills, sweats  EYES Negative for scleral icterus or redness, and diplopia  HEENT: Negative for hoarseness  RESPIRATORY: Negative for cough and severe shortness of breath  CARDIOVASCULAR: Negative for crushing sub-sternal chest pain  GASTROINTESTINAL: See HPI  GENITOURINARY: Negative for dysuria  MUSCULOSKELETAL: Negative for arthralgias and myalgias  SKIN: Negative for jaundice, rash or pruritus  NEUROLOGICAL: Negative for headaches +dizziness  BEHAVIOR/PSYCH: Negative for psychotic behavior      PHYSICAL EXAM:   Blood pressure 128/78, pulse 82, height 5' 11\" (1.803 m), weight 163 lb (73.9 kg).    GEN: Alert, no acute distress, well-nourished   HEENT: anicteric sclera, neck supple, trachea midline, MMM, no palpable or tender neck or supraclavicular lymph nodes  CV: RRR, the extremities are warm and well perfused   LUNGS: No increased work of breathing, CTAB  ABDOMEN: Soft, symmetrical, non-tender without distention or guarding. No scars or lesions. Aorta is without bruit or visible pulsation. Umbilicus is midline without herniation. Normoactive bowel sounds are present, No masses, hepatomegaly or splenomegaly noted.  MSK: No erythema, no warmth, no swelling of joints  SKIN: No jaundice, no erythema, no rashes, no lesions  HEMATOLOGIC: No bleeding, no bruising  NEURO: Alert and interactive, CORRALES  PSYCH: appropriate mood & affect    Labs/Imaging:     Patient's labs and imaging were reviewed and discussed with patient today.    .  ASSESSMENT/PLAN:   Navi Portillo is a 49 year old year-old male with active diagnoses including meniere's disease. Prior medical/surgical hx in note table. The patient presents for colon cancer screening evaluation.    #BRBPR  -a couple times a year notices drop of blood on tissue when wiping after bowel movement  -possibly hemorrhoid vs skin irritation from wiping. Due for first screening colonoscopy. Advised to follow up if symptoms worsen or change    #colorectal cancer screening:  patient is considered average risk for colon cancer (No immediate family hx of colon cancer) and it is appropriate to proceed with screening colonoscopy. Patient is currently asymptomatic and denies diarrhea, hematochezia, thin-stools or weight loss. We discussed risks/benefits/alternatives to procedure, including stool testing, they want to proceed with colonoscopy.  -no anemia noted on blood work.      Recommend:  -1. Schedule colonoscopy with General Pool Endoscopist  Diagnosis: CRC screen, BRBPR  Sedation: MAC or IV  Prep: split dose golytely    Medication changes:     *please read bowel preparation handout for complete list of medication adjustments*  14 Days Before Procedure   STOP all vitamins and supplements, including: Iron, multivitamins, vitamin E, herbal/mineral supplements, and over-the-counter diet medications    3 Days Before Procedure   STOP any erectile dysfunction medications (e.g., Viagra, Cialis, Levitra, Slendra)    Day of Procedure   STOP all diuretics  (e.g. hydrochlorothiazide, furosemide, spironolactone)    24 Hours Before Procedure   DO NOT CONSUME any alcohol or use recreational drugs       -Anti-platelets and anti-coagulants: N/A   -Diabetes meds: N/A     Endoscopy risk/benefit discussion: I have thoroughly discussed the risks, benefits, and alternatives of endoscopic evaluation with the patient, who demonstrated understanding. This includes the potential risks of bleeding, infection, pain, anesthesia complications, and perforation, which may result in prolonged hospitalization or surgical intervention. All of the patient’s questions were addressed to their satisfaction. The patient has chosen to proceed with the endoscopic procedure, including any necessary interventions such as polypectomy, biopsy, control of bleeding.      Orders This Visit:  No orders of the defined types were placed in this encounter.      Meds This Visit:  Requested Prescriptions     Signed Prescriptions Disp  Refills    polyethylene glycol, PEG 3350-KCl-NaBcb-NaCl-NaSulf, 236 g Oral Recon Soln 1 each 0     Sig: Take 4,000 mL by mouth As Directed. Take 2,000 mL the night before your procedure and 2,000 mL the morning of your procedure. Take as directed by GI clinic. Okay to substitute for generic.       Imaging & Referrals:  None       MARGARITO Dunlap    [facility]  7/10/2025      The dictation was partially prepared using Dragon Medical voice recognition software. As a result, errors may occur. When identified, these errors have been corrected. While every attempt is made to correct errors during dictation, discrepancies may still exist.              [1]   Past Medical History:   Anxiety    Erectile dysfunction, unspecified erectile dysfunction type    Migraine   [2]   Past Surgical History:  Procedure Laterality Date    Other surgical history  about 2013    Deviated Septum Repair    Other surgical history  12/14/2018    Cystoscopy w/ Dr Jones    Sinus surgery        Vasectomy     [3]   Family History  Problem Relation Age of Onset    No Known Problems Father     Other (Other) Mother         migraines   [4]   Social History  Socioeconomic History    Marital status:    Tobacco Use    Smoking status: Never    Smokeless tobacco: Never   Vaping Use    Vaping status: Never Used   Substance and Sexual Activity    Alcohol use: Not Currently    Drug use: Never     Social Drivers of Health     Food Insecurity: Unknown (6/12/2025)    NCSS - Food Insecurity     Ran Out of Food in the Last Year: No   Transportation Needs: No Transportation Needs (6/12/2025)    NCSS - Transportation     Lack of Transportation: No   Housing Stability: Not At Risk (6/12/2025)    NCSS - Housing/Utilities     Has Housing: Yes     Worried About Losing Housing: No     Unable to Get Utilities: No   [5]   Current Outpatient Medications   Medication Sig Dispense Refill    polyethylene glycol, PEG 3350-KCl-NaBcb-NaCl-NaSulf,  236 g Oral Recon Soln Take 4,000 mL by mouth As Directed. Take 2,000 mL the night before your procedure and 2,000 mL the morning of your procedure. Take as directed by GI clinic. Okay to substitute for generic. 1 each 0    Tadalafil 10 MG Oral Tab Take 1 tablet (10 mg total) by mouth daily as needed for Erectile Dysfunction. 30 tablet 1    triamterene-hydroCHLOROthiazide 37.5-25 MG Oral Cap Take 1 capsule by mouth in the morning and 1 capsule before bedtime. 60 capsule 1   [6] No Known Allergies

## 2025-07-10 NOTE — TELEPHONE ENCOUNTER
Scheduled for:  Colonoscopy 09[address]   Provider Name:  Dr. Barnhart   Date:  10/31/2025  Location:    [facility]  Sedation:  Mac  Time:  9:50 (pt is aware that ENDO will call the day before to confirm arrival time)  Prep:  Golytely   Meds/Allergies Reconciled?:  Physician reviewed   Diagnosis with codes:  Colon cancer screening Z12.11 BRBPR K62.5  Was patient informed to call insurance with codes (Y/N):  Yes, I confirmed Medicaid insurance with the patient.   Referral sent?:  Referral was sent at the time of electronic surgical scheduling.  [facility] or [facility] notified?:  I sent an electronic request to Endo Scheduling and received a confirmation today.   Medication Orders:  This patient verbally confirmed that he is not taking:   Iron, blood thinners, BP meds, and is not diabetic   Not latex allergy, Not PCN allergy and does not have a pacemaker  Misc Orders:  I discussed the prep instructions with the patient which he verbally understood and is aware that I will mychart the instructions today.       Further instructions given by staff:

## 2025-07-10 NOTE — PATIENT INSTRUCTIONS
VISIT SUMMARY:  Today, you came in for a follow-up on your elevated cholesterol and cardiovascular risk assessment. We discussed your recent lipid panel results, your blood pressure readings, and your family history of cardiovascular issues. We also touched on your history of Meniere's disease and its potential connection to your cardiovascular health.    YOUR PLAN:  -HYPERTENSION: Hypertension means high blood pressure. Your blood pressure readings at home and in the clinic are within a controlled range. Please continue to monitor your blood pressure at home regularly and report your readings through [employer] or call the clinic if you have any concerns.    -MENIERE'S DISEASE: Meniere's disease is a disorder of the inner ear that can cause vertigo. While your Meniere's disease is managed, it's important to monitor your cardiovascular health to prevent any potential exacerbation of your symptoms.    -ELEVATED CHOLESTEROL: Elevated cholesterol means you have higher levels of cholesterol in your blood, which can increase your risk of heart disease. Your recent lipid panel was not fasting, so we will repeat the test in two months under fasting conditions. We will also order a heart scan to assess your calcium score. If your repeat lipid panel shows elevated cholesterol, we will discuss starting statin therapy.    INSTRUCTIONS:  Please schedule a repeat lipid panel in two months, ensuring you fast before the test. Additionally, schedule a heart scan to assess your calcium score; note that this will cost $50 and is not covered by insurance. Continue to monitor your blood pressure at home and report your readings through [employer] or call the clinic if you have any concerns.    Contains text generated by Zander

## 2025-07-10 NOTE — PROGRESS NOTES
OFFICE NOTE       The following individual(s) verbally consented to be recorded using ambient AI listening technology and understand that they can each withdraw their consent to this listening technology at any point by asking the clinician to turn off or pause the recording:    Patient name: Navi Portillo  Additional names:  none          Patient ID: Navi Portillo is a 49 year old male.  Today's Date: 07/10/25  Chief Complaint: Follow - Up (Re check labs)    HPI  History of Present Illness  Navi Portillo is a 49 year old male who presents for follow-up on his elevated cholesterol and cardiovascular risk assessment.    He is concerned about his recent lipid panel results, which showed elevated cholesterol levels. The test was not conducted under fasting conditions, which he believes affected the accuracy of the results. His triglycerides were high, HDL was low, and LDL was 117. He wants to repeat the test under fasting conditions to obtain accurate results.    He has been monitoring his blood pressure at home, with typical readings around 130/80 mmHg and occasional diastolic readings in the high 70s. His blood pressure was 128/78 mmHg earlier today. He does not believe his blood pressure is high enough to require medication at this time.    He mentions a family history of cardiovascular issues, specifically noting that his cousin, who is 55 years old and leads a healthy lifestyle, recently had a high calcium score and underwent bypass surgery. This family history raises his concern about potential genetic factors affecting his cardiovascular health.    He has a history of Meniere's disease and wonders if any heart-related issues could be contributing to his symptoms of vertigo. No chest pain or shortness of breath. He is proactive about his health, having scheduled a colonoscopy following a recent visit to a gastroenterologist.       Vitals:    07/10/25 1421   BP: 159/75   Pulse: 81   Resp:  18   SpO2: 97%   Height: 5' 11\" (1.803 m)     body mass index is 22.73 kg/m².  BP Readings from Last 3 Encounters:   07/10/25 159/75   07/10/25 128/78   06/12/25 145/82     The 10-year ASCVD risk score (Crystal CHRISTIAN, et al., 2019) is: 9.6%    Values used to calculate the score:      Age: 49 years      Sex: Male      Is Non- : No      Diabetic: No      Tobacco smoker: No      Systolic Blood Pressure: 159 mmHg      Is BP treated: Yes      HDL Cholesterol: 31 mg/dL      Total Cholesterol: 198 mg/dL  Results  LABS  LDL: 117 mg/dL       Medications reviewed:  Current Medications[1]      Assessment & Plan    1. Hypertension with goal blood pressure less than 130/80 (Primary)  2. Dyslipidemia  -     Lipid Panel; Future; Expected date: 07/10/2025  3. Encounter for screening for coronary artery disease  -     CT CALCIUM SCORING; Future; Expected date: 07/10/2025    Assessment & Plan  Hypertension  Blood pressure controlled at 130/80 mmHg at home, 128/78 mmHg in clinic.  - Monitor blood pressure at home regularly.  - Report readings through [employer] or call clinic if concerns.    Meniere's disease  Managed, cardiovascular issues may exacerbate vertigo.  - Monitor cardiovascular health to prevent symptom exacerbation.    Elevated cholesterol  Non-fasting lipid panel showed  mg/dL, elevated triglycerides, cardiovascular risk 8.2%.  - Order repeat lipid panel today, patient is fasting.  - Order heart scan to assess calcium score.  - Provide information on scheduling heart scan, $50 cost not covered by insurance.  - Discuss starting statin therapy if repeat lipid panel shows elevated cholesterol.       Follow Up: As needed/if symptoms worsen or Return in about 3 months (around 10/10/2025) for BLOOD PRESSURE; TRACK AT HOME, BRING RESULTS, DEPENDING ON RESULTS..     I spent 32 minutes obtaining pertitent medical history, reviewing pertinent imaging/labs and specialists notes, evaluating patient,  discussing differential diagnosis' and various treatment options, reinforcing importance of compliance with treatment plan, and completing documentation.     Encounter Times  PreChartin minutes    Reviewing/Obtainin minutes      Medical Exam: 4 minutes    Plan: 7 minutes      Notes: 5 minutes    Counseling/Education: 7 minutes      Referring/Communicatin minutes    Ind Interpretation:   minutes      Care Coordination:   minutes       My total time spent caring for the patient on the day of the encounter: 32 minutes.       Objective/ Results:   Physical Exam   Physical Exam  VITALS: BP- 128/78  GENERAL: Alert, cooperative, well developed, no acute distress  HEENT: Normocephalic, normal oropharynx, moist mucous membranes  CHEST: Clear to auscultation bilaterally, no wheezes, rhonchi, or crackles  CARDIOVASCULAR: Normal heart rate and rhythm, S1 and S2 normal without murmurs  ABDOMEN: Soft, non-tender, non-distended, without organomegaly, normal bowel sounds  EXTREMITIES: No cyanosis or edema  NEUROLOGICAL: Cranial nerves grossly intact, moves all extremities without gross motor or sensory deficit     Reviewed:    Patient Active Problem List    Diagnosis    Hypertension with goal blood pressure less than 130/80    Asymmetrical sensorineural hearing loss    Meniere's disease    Anxiety    Elevated blood sugar    Erectile dysfunction, unspecified erectile dysfunction type    BPH with obstruction/lower urinary tract symptoms    Chronic mixed headache syndrome    Anxiety states    Migraine      Allergies[2]   Short Social Hx on File[3]   Review of Systems    All other systems negative unless otherwise stated in ROS or HPI above.       MARGARITO Sandhu  Internal Medicine       Call office with any questions or seek emergency care if necessary.   Patient understands and agrees to follow directions and advice.      ----------------------------------------- PATIENT INSTRUCTIONS-----------------------------------------      Patient Instructions   VISIT SUMMARY:  Today, you came in for a follow-up on your elevated cholesterol and cardiovascular risk assessment. We discussed your recent lipid panel results, your blood pressure readings, and your family history of cardiovascular issues. We also touched on your history of Meniere's disease and its potential connection to your cardiovascular health.    YOUR PLAN:  -HYPERTENSION: Hypertension means high blood pressure. Your blood pressure readings at home and in the clinic are within a controlled range. Please continue to monitor your blood pressure at home regularly and report your readings through [employer] or call the clinic if you have any concerns.    -MENIERE'S DISEASE: Meniere's disease is a disorder of the inner ear that can cause vertigo. While your Meniere's disease is managed, it's important to monitor your cardiovascular health to prevent any potential exacerbation of your symptoms.    -ELEVATED CHOLESTEROL: Elevated cholesterol means you have higher levels of cholesterol in your blood, which can increase your risk of heart disease. Your recent lipid panel was not fasting, so we will repeat the test in two months under fasting conditions. We will also order a heart scan to assess your calcium score. If your repeat lipid panel shows elevated cholesterol, we will discuss starting statin therapy.    INSTRUCTIONS:  Please schedule a repeat lipid panel in two months, ensuring you fast before the test. Additionally, schedule a heart scan to assess your calcium score; note that this will cost $50 and is not covered by insurance. Continue to monitor your blood pressure at home and report your readings through [employer] or call the clinic if you have any concerns.    Contains text generated by [employer]ander          The 21st Century Cures Act makes medical notes available to patients in the interest of transparency.  However, please be advised that this is a medical document.  It is intended as  a peer to peer communication.  It is written in medical language and may contain abbreviations or verbiage that are technical and unfamiliar.  It may appear blunt or direct.  Medical documents are intended to carry relevant information, facts as evident, and the clinical opinion of the practitioner.            [1]   Current Outpatient Medications   Medication Sig Dispense Refill    polyethylene glycol, PEG 3350-KCl-NaBcb-NaCl-NaSulf, 236 g Oral Recon Soln Take 4,000 mL by mouth As Directed. Take 2,000 mL the night before your procedure and 2,000 mL the morning of your procedure. Take as directed by GI clinic. Okay to substitute for generic. 1 each 0    Tadalafil 10 MG Oral Tab Take 1 tablet (10 mg total) by mouth daily as needed for Erectile Dysfunction. 30 tablet 1    triamterene-hydroCHLOROthiazide 37.5-25 MG Oral Cap Take 1 capsule by mouth in the morning and 1 capsule before bedtime. 60 capsule 1   [2] No Known Allergies  [3]   Social History  Socioeconomic History    Marital status:    Tobacco Use    Smoking status: Never    Smokeless tobacco: Never   Vaping Use    Vaping status: Never Used   Substance and Sexual Activity    Alcohol use: Not Currently    Drug use: Never     Social Drivers of Health     Food Insecurity: Unknown (6/12/2025)    NCSS - Food Insecurity     Ran Out of Food in the Last Year: No   Transportation Needs: No Transportation Needs (6/12/2025)    NCSS - Transportation     Lack of Transportation: No   Housing Stability: Not At Risk (6/12/2025)    NCSS - Housing/Utilities     Has Housing: Yes     Worried About Losing Housing: No     Unable to Get Utilities: No

## 2025-07-10 NOTE — PATIENT INSTRUCTIONS
1. Schedule colonoscopy with General Pool Endoscopist  Diagnosis: CRC screen, BRBPR  Sedation: MAC or IV  Prep: split dose golytely    Medication changes:     *please read bowel preparation handout for complete list of medication adjustments*  14 Days Before Procedure   STOP all vitamins and supplements, including: Iron, multivitamins, vitamin E, herbal/mineral supplements, and over-the-counter diet medications    3 Days Before Procedure   STOP any erectile dysfunction medications (e.g., Viagra, Cialis, Levitra, Slendra)    Day of Procedure   STOP all diuretics  (e.g. hydrochlorothiazide, furosemide, spironolactone)    24 Hours Before Procedure   DO NOT CONSUME any alcohol or use recreational drugs         2.  bowel prep from pharmacy   You can pick the bowel prep up now and store in a cool, dry place in your home until your scheduled bowel prep start date.    3. DO NOT TAKE: Any form of alcohol, recreational drugs 24 hours prior to procedure.    4. Read all bowel prep instructions and medication adjustments carefully. Bowel prep instructions can also be found online at:  www.health.org/giprep     5. AVOID seeds, nuts, popcorn, raw fruits and vegetables for 5 days before procedure    6. If you start any NEW medication after your visit today, please notify us. Certain medications will need to be held before the procedure, or the procedure cannot be performed safely.

## 2025-07-13 ENCOUNTER — HOSPITAL ENCOUNTER (OUTPATIENT)
Dept: CT IMAGING | Age: 50
Discharge: HOME | End: 2025-07-13
Attending: NURSE PRACTITIONER

## 2025-07-13 DIAGNOSIS — Z13.6 ENCOUNTER FOR SCREENING FOR CORONARY ARTERY DISEASE: ICD-10-CM

## 2025-07-14 RX ORDER — TRIAMTERENE AND HYDROCHLOROTHIAZIDE 37.5; 25 MG/1; MG/1
1 CAPSULE ORAL 2 TIMES DAILY
Qty: 60 CAPSULE | Refills: 1 | Status: SHIPPED | OUTPATIENT
Start: 2025-07-14

## 2025-07-14 NOTE — TELEPHONE ENCOUNTER
This refill request is being sent to the provider for the following reason:  []Patient has not had an appointment within the past 12 months but has made an appointment on: ___  [x]Medication is not within protocol - Triamterene   []Patient did not complete follow up recommendations  [x]Other: __Forgot to ask for refills in her appt, he will be out of medication tomorrow    Last office visit was:  7/10/25

## 2025-07-21 NOTE — PROGRESS NOTES
PROGRESS NOTE  OTOLOGY/OTOLARYNGOLOGY    REF MD:  No referring provider defined for this encounter.     PCP: None Pcp    CHIEF COMPLAINT:    Chief Complaint   Patient presents with    Follow - Up     Patient here for Meniere's disease f/up.      INTERVAL HX:     Navi Portillo is a 49 year old male who presents with worsening tinnitus and hearing loss.    He experiences worsening tinnitus and hearing loss, particularly in the right ear, described as a 'crazy industrial engine sound'. The ringing has been severe over the last few weeks, especially after flying, and he is unable to hear anything out of the affected ear.    He recently had a lipid panel with alarming results, which he attributes to not fasting prior to the test. He has since repeated the test after fasting and is awaiting results. He is concerned about his cholesterol levels and has scheduled a calcium score CT for further evaluation.    He shares a family history of cardiovascular issues, noting that his 55-year-old cousin, who was previously healthy and active, experienced severe vertigo and required surgery. This has heightened his concern about potential genetic factors, despite his father's side of the family being morbidly obese but without heart issues.    He has previously tried propranolol for his symptoms but experienced significant brain fog and discontinued its use. He is currently considering starting nortriptyline but is hesitant to begin new medications before receiving his test results. He has also been using a water pill.    His symptoms impact his daily life, causing difficulty concentrating due to the constant noise in his ear, which he likens to trying to concentrate while someone is speaking to him.  History of Present Illness  Navi Portillo is a 49 year old male with Meniere's disease who presents with worsening symptoms and medication side effects.    His symptoms worsened with the use of topiramate, which he stopped three  weeks ago after experiencing daily headaches for two weeks and significant brain fog. The medication did not improve his symptoms and instead caused additional adverse effects.    He previously took venlafaxine twice daily, which he discontinued due to side effects including stomach upset and a gagging reflex. He is concerned about the long-term use of diuretics and is interested in exploring other treatment options.    He has undergone a series of three steroid injections in the ear for his Meniere's disease, with the first injection providing significant but short-lived relief. The injections were administered weeks apart by a previous doctor, and he recalls that his hearing loss has been documented for about a year, with a significant drop in hearing ability.    He has tried oral prednisone in the past without noticing any improvement in his dizziness. He is scheduled to travel to [place] from June 21st to July 1st and desires a medication that does not cause severe side effects, as previous medications have led to intolerable symptoms such as headaches and brain fog.    He mentions a history of erectile dysfunction, which he attributes to past use of Propecia, noting that it caused permanent dysfunction. He is concerned about potential worsening of this condition with new medications.      HISTORY OF PRESENT ILLNESS: Navi Portillo is a 49 year old male patient presents for evaluation of Meniere's disease, which he has been experiencing in his right ear for the past three years. The patient had been seen at [facility] in 2021 by Dr. Lorenzo multiple times for Meniere's syndrome and chronic sinusitis. He has undergone an extensive workup by Dr. Silverman, and most recently saw Dr. Menchaca on 12/13/2024, who advised increasing his Dyazide dosage to two tablets per day, and recommended magnesium. He is referred here to consider vestibular migraine.     Patient reports experiencing dizziness that can last from 10  minutes to several hours. He describes the dizziness as feeling as if he is upside down and spinning. Between episodes, he experiences tinnitus and ear pressure. He also reports experiencing extremely brief episodes of vertigo, each lasting around 2 minutes. He also has a tone-like tinnitus when he presses on his right ear.    His most recent audiogram results show sloping sensorineural hearing loss in the right ear. MRI and CT scan results were normal. He has received three steroid injections with no improvement. The patient is currently on diphenhydramine and Dramamine. His only surgery to date has been a nasal septal repair. He has tried magnesium supplements, meclizine, and hydroxyzine all without notable improvement.    PAST MEDICAL HISTORY:    Past Medical History:    Anxiety    Erectile dysfunction, unspecified erectile dysfunction type    Migraine       PAST SURGICAL HISTORY:    Past Surgical History:   Procedure Laterality Date    Other surgical history  about 2013    Deviated Septum Repair    Other surgical history  12/14/2018    Cystoscopy w/ Dr Jones    Sinus surgery        Vasectomy         Medications Ordered Prior to Encounter[1]    Allergies: Allergies[2]    SOCIAL HISTORY:    Social History     Tobacco Use    Smoking status: Never    Smokeless tobacco: Never   Substance Use Topics    Alcohol use: Not Currently       Family History   Problem Relation Age of Onset    No Known Problems Father     Other (Other) Mother         migraines       REVIEW OF SYSTEMS:   Positives are in bold  Neuro: Headache, facial weakness, facial numbness, neck pain, vertigo  ENT: Hearing change, tinnitus, otorrhea, otalgia, aural fullness, ear pressure, vertigo, imbalance  Sinus pressure, rhinorrhea, congestion, facial pain, jaw pain, dysphagia, odynophagia, sore throat, voice changes, shortness of breath    EXAMINATION:  I washed my hands with an alcohol-based hand gel prior to examination  Constitutional:   --Vitals:  Height 5' 11\" (1.803 m), weight 163 lb (73.9 kg).  --General: no apparent distress, well-developed, conversant  Psych: affect pleasant and appropriate for age, alert and oriented  Neuro: Facial movement normal bilateral  Respiratory: No stridor, stertor or increased work of breathing  ENT:  --Ear: The bilateral ears were examined under binocular microscopy  Right ear microscopic exam:  Pinna: Normal, no lesions or masses.  Mastoid: Nontender on palpation.   External auditory canal: Clear, no masses or lesions.  Tympanic membrane: Intact, no lesions, normal landmarks.  Middle ear: Aerated.    Left ear microscopic exam:  Pinna: Normal, no lesions or masses.  Mastoid: Nontender on palpation.   External auditory canal: Clear, no masses or lesions.  Tympanic membrane: Intact, no lesions, normal landmarks.  Middle ear: Aerated.    CT Remporal Bones - 6/27/2024  IMPRESSION: Essentially negative CT temporal bones survey.    MRI IACs 2/25/2022  Impression   IMPRESSION:  Normal contrast-enhanced MRI of the brain and IACs.     VNG 2/27/2025  Interpretation:  Right unilateral caloric weakness.    Caloric responses of the right ear are 28% weaker than those of the left ear.  Unilateral weakness measured as greater than 25% is considered to be clinically significant.  Caloric weakness usually indicates a peripheral vestibular lesion involving the lateral canal or its afferent pathways.     Borderline bilateral caloric weakness.    Bilateral weakness is categorized as sum for all 4 caloric responses less than 22 deg  ( Patient was 28)  Or Total responses from each ear less than 12 deg   ( Patient R=10. L=18)     Latest Audiogram Result (Hz) Exam performed: 2/26/2025 3:20 PM Last edited by Jesenia Stubbs Au.D on 2/26/2025 3:30 PM        125 250  1500 2000 3000 4000 6000 8000    Right air:         60  65    Left air:  10 20  15  15 45 40 10 20    Right air (masked):  65 65  65  60  60      Left mastoid bone:   5  15  10  35 45      Right mastoid bone (masked):   50N  60  60  55         Reliability:  Fair    Transducer:  Inserts    Technique:  Conventional Audiometry    Comments:  Negative Mindy 1000z          Latest Speech Audiometry  Last edited by Jesenia Stubbs Au.D on 2/26/2025 3:30 PM       Ear Method PTA SAT SRT [facility] Test/list Score (%) Intensity Mask/noise Notes    right live voice  65    Half-List 12 75  masked    left live voice  20    10 By Difficulty 96 55                    Latest Tympanogram Result       Probe Tone (Hz): 226 Exam performed: 2/26/2025 3:20 PM Last edited by Jesenia Stubbs Au.D on 2/26/2025 3:31 PM      Tympanograms  These were drawn by a user, not generated from device data      Right Ear Left Ear                     Right Ear Left Ear    Tympanogram type: Type A Type C    Canal volume (mL): 1.3 1.2    Peak pressure (daPa): 10 -101    Peak amplitude (mL): 0.8 0.79    Tympanogram width (daPa):        Comments:                    Latest Audiogram and Tympanogram Result Text  Last edited by Jesenia Stubbs Au.D on 2/26/2025  4:03 PM      Addendum      Patient here for testing on referral from Dr. Wall  He is here for HT/VNG/VEMP today    ECoG testing is scheduled for 3/4/2025    See history and notes on VNG/VEMP report     Otoscopic Inspection:  both ears: no cerumen and TM visualized    Summary  Right: Moderately severe SNHL  Left: WNL with moderate notched SNHL at 3000/4000Hz.    Normal tympanogram for the right ear   Negative pressure tympanogram for the left ear     Follow up with Liam Eng M.D..  Audiological monitoring as needed during the course of medical management.       Addended by Jesenia Stubbs Au.D on 2/26/2025  4:03 PM                ASSESSMENT/PLAN:  Navi Portillo is a 49 year old male with   No diagnosis found.       Assessment & Plan    Active cochleovestibular Meniere's disease, right  Chronic condition with worsening symptoms, including severe tinnitus and  profound hearing loss in the right ear. Potential exacerbation by cardiac issues. Focus on symptom management.  - Continue Triamterene-hydrochlorothiazide 37.5-25 MG oral bid.  - Consider steroid injections with dexamethasone into the ear, series of four injections over three weeks.  - Evaluate potential for surgical intervention if symptoms persist.  - Monitor impact of cardiac evaluation and treatment on Meniere's symptoms.    Asymmetric sensorineural hearing loss  Profound hearing loss in the right ear, likely related to Meniere's disease. Focus on symptom management.  - Consider steroid injections for associated symptoms.  - Evaluate potential for surgical intervention if symptoms persist.    Vestibular migraine  Chronic condition potentially exacerbated by cardiac issues. Previous propranolol discontinued due to adverse effects. Considering nortriptyline post-cardiac evaluation.  - Consider starting nortriptyline after cardiac evaluation results.  - Evaluate impact of cardiac evaluation and treatment on vestibular migraine symptoms.    Cardiac concerns  Potential cardiac issues with family history and recent lipid panel results. Possible link to vestibular symptoms.  - Review calcium score CT results once available.  - Coordinate with cardiology for further evaluation and management.      Follow-up in 4-6 weeks    Situation reviewed with the patient in detail.    Liam Eng MD  Otology/Otolaryngology  [facility] [address] [place] [id number]  Phone [phone]  Fax [phone]                 [1]   Current Outpatient Medications on File Prior to Visit   Medication Sig Dispense Refill    Tadalafil 10 MG Oral Tab Take 1 tablet (10 mg total) by mouth daily as needed for Erectile Dysfunction. 30 tablet 1     No current facility-administered medications on file prior to visit.   [2] No Known Allergies